# Patient Record
Sex: FEMALE | Race: WHITE | Employment: FULL TIME | URBAN - METROPOLITAN AREA
[De-identification: names, ages, dates, MRNs, and addresses within clinical notes are randomized per-mention and may not be internally consistent; named-entity substitution may affect disease eponyms.]

---
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
## 2019-02-12 ENCOUNTER — HOSPITAL ENCOUNTER (EMERGENCY)
Age: 19
Discharge: HOME | End: 2019-02-12
Payer: COMMERCIAL

## 2019-02-12 ENCOUNTER — APPOINTMENT (OUTPATIENT)
Dept: GENERAL RADIOLOGY | Age: 19
End: 2019-02-12
Payer: COMMERCIAL

## 2019-02-12 VITALS
DIASTOLIC BLOOD PRESSURE: 72 MMHG | TEMPERATURE: 99 F | OXYGEN SATURATION: 95 % | WEIGHT: 160 LBS | HEART RATE: 72 BPM | RESPIRATION RATE: 18 BRPM | HEIGHT: 65 IN | SYSTOLIC BLOOD PRESSURE: 117 MMHG | BODY MASS INDEX: 26.66 KG/M2

## 2019-02-12 DIAGNOSIS — S93.402A SPRAIN OF LEFT ANKLE, UNSPECIFIED LIGAMENT, INITIAL ENCOUNTER: Primary | ICD-10-CM

## 2019-02-12 PROCEDURE — 6370000000 HC RX 637 (ALT 250 FOR IP): Performed by: PHYSICIAN ASSISTANT

## 2019-02-12 PROCEDURE — 99283 EMERGENCY DEPT VISIT LOW MDM: CPT

## 2019-02-12 PROCEDURE — 73600 X-RAY EXAM OF ANKLE: CPT

## 2019-02-12 RX ORDER — IBUPROFEN 800 MG/1
800 TABLET ORAL ONCE
Status: COMPLETED | OUTPATIENT
Start: 2019-02-12 | End: 2019-02-12

## 2019-02-12 RX ORDER — IBUPROFEN 800 MG/1
800 TABLET ORAL EVERY 6 HOURS PRN
Qty: 30 TABLET | Refills: 0 | Status: SHIPPED | OUTPATIENT
Start: 2019-02-12 | End: 2019-08-26

## 2019-02-12 RX ADMIN — IBUPROFEN 800 MG: 800 TABLET, FILM COATED ORAL at 01:55

## 2019-02-12 ASSESSMENT — PAIN DESCRIPTION - LOCATION: LOCATION: ANKLE

## 2019-02-12 ASSESSMENT — PAIN SCALES - GENERAL
PAINLEVEL_OUTOF10: 8
PAINLEVEL_OUTOF10: 8

## 2019-02-12 ASSESSMENT — PAIN DESCRIPTION - PAIN TYPE: TYPE: ACUTE PAIN

## 2019-03-04 ENCOUNTER — APPOINTMENT (OUTPATIENT)
Dept: GENERAL RADIOLOGY | Age: 19
End: 2019-03-04
Payer: COMMERCIAL

## 2019-03-04 ENCOUNTER — HOSPITAL ENCOUNTER (EMERGENCY)
Age: 19
Discharge: HOME | End: 2019-03-04
Payer: COMMERCIAL

## 2019-03-04 VITALS
DIASTOLIC BLOOD PRESSURE: 86 MMHG | WEIGHT: 160 LBS | OXYGEN SATURATION: 98 % | HEIGHT: 65 IN | RESPIRATION RATE: 15 BRPM | BODY MASS INDEX: 26.66 KG/M2 | TEMPERATURE: 98.8 F | SYSTOLIC BLOOD PRESSURE: 124 MMHG | HEART RATE: 78 BPM

## 2019-03-04 DIAGNOSIS — M25.571 ACUTE RIGHT ANKLE PAIN: ICD-10-CM

## 2019-03-04 DIAGNOSIS — S80.01XA CONTUSION OF RIGHT KNEE, INITIAL ENCOUNTER: Primary | ICD-10-CM

## 2019-03-04 PROCEDURE — 73610 X-RAY EXAM OF ANKLE: CPT

## 2019-03-04 PROCEDURE — 99283 EMERGENCY DEPT VISIT LOW MDM: CPT

## 2019-03-04 PROCEDURE — 73560 X-RAY EXAM OF KNEE 1 OR 2: CPT

## 2019-03-04 RX ORDER — NAPROXEN 500 MG/1
500 TABLET ORAL 2 TIMES DAILY PRN
Qty: 20 TABLET | Refills: 0 | Status: SHIPPED | OUTPATIENT
Start: 2019-03-04 | End: 2019-08-26

## 2019-03-04 ASSESSMENT — PAIN SCALES - GENERAL: PAINLEVEL_OUTOF10: 7

## 2019-08-26 ENCOUNTER — APPOINTMENT (OUTPATIENT)
Dept: GENERAL RADIOLOGY | Age: 19
End: 2019-08-26
Payer: COMMERCIAL

## 2019-08-26 ENCOUNTER — HOSPITAL ENCOUNTER (EMERGENCY)
Age: 19
Discharge: HOME | End: 2019-08-26
Payer: COMMERCIAL

## 2019-08-26 VITALS
SYSTOLIC BLOOD PRESSURE: 112 MMHG | HEIGHT: 65 IN | BODY MASS INDEX: 27.32 KG/M2 | HEART RATE: 68 BPM | WEIGHT: 164 LBS | TEMPERATURE: 98.3 F | DIASTOLIC BLOOD PRESSURE: 51 MMHG | OXYGEN SATURATION: 100 % | RESPIRATION RATE: 14 BRPM

## 2019-08-26 DIAGNOSIS — S09.92XA NOSE INJURY, INITIAL ENCOUNTER: Primary | ICD-10-CM

## 2019-08-26 PROCEDURE — 99283 EMERGENCY DEPT VISIT LOW MDM: CPT

## 2019-08-26 PROCEDURE — 6370000000 HC RX 637 (ALT 250 FOR IP): Performed by: PHYSICIAN ASSISTANT

## 2019-08-26 PROCEDURE — 70160 X-RAY EXAM OF NASAL BONES: CPT

## 2019-08-26 RX ORDER — IBUPROFEN 600 MG/1
600 TABLET ORAL EVERY 6 HOURS PRN
Qty: 20 TABLET | Refills: 0 | Status: SHIPPED | OUTPATIENT
Start: 2019-08-26 | End: 2020-02-13

## 2019-08-26 RX ORDER — IBUPROFEN 600 MG/1
600 TABLET ORAL ONCE
Status: COMPLETED | OUTPATIENT
Start: 2019-08-26 | End: 2019-08-26

## 2019-08-26 RX ADMIN — IBUPROFEN 600 MG: 600 TABLET ORAL at 18:23

## 2019-08-26 ASSESSMENT — PAIN DESCRIPTION - LOCATION: LOCATION: FACE

## 2019-08-26 ASSESSMENT — PAIN DESCRIPTION - PAIN TYPE: TYPE: ACUTE PAIN

## 2019-08-26 ASSESSMENT — PAIN SCALES - GENERAL
PAINLEVEL_OUTOF10: 8
PAINLEVEL_OUTOF10: 8

## 2019-08-26 ASSESSMENT — PAIN DESCRIPTION - DESCRIPTORS: DESCRIPTORS: ACHING;PRESSURE

## 2019-08-26 NOTE — ED PROVIDER NOTES
days 30 tablet 0    pantoprazole (PROTONIX) 40 MG tablet Take 1 tablet by mouth every morning (before breakfast) 12 tablet 0    White Petrolatum-Mineral Oil (ARTIFICIAL TEARS) 83-15 % Place into the right eye every evening 1 Tube 2    buPROPion (WELLBUTRIN SR) 150 MG extended release tablet Take 150 mg by mouth daily      acetaminophen-codeine (TYLENOL/CODEINE #3) 300-30 MG per tablet Take 1 tablet by mouth every 6 hours as needed for Pain 6 tablet 0    Dexmethylphenidate HCl ER (FOCALIN XR) 30 MG CP24 Take 30 mg by mouth 2 times daily       sertraline (ZOLOFT) 100 MG tablet Take 150 mg by mouth daily       hydrOXYzine (VISTARIL) 25 MG capsule Take 25 mg by mouth 3 times daily as needed for Itching         ALLERGIES    No Known Allergies    SOCIAL & FAMILY HISTORY    Social History     Socioeconomic History    Marital status: Single     Spouse name: None    Number of children: None    Years of education: None    Highest education level: None   Occupational History    None   Social Needs    Financial resource strain: None    Food insecurity:     Worry: None     Inability: None    Transportation needs:     Medical: None     Non-medical: None   Tobacco Use    Smoking status: Current Some Day Smoker     Packs/day: 1.50    Smokeless tobacco: Never Used   Substance and Sexual Activity    Alcohol use: No    Drug use: No    Sexual activity: Never   Lifestyle    Physical activity:     Days per week: None     Minutes per session: None    Stress: None   Relationships    Social connections:     Talks on phone: None     Gets together: None     Attends Denominational service: None     Active member of club or organization: None     Attends meetings of clubs or organizations: None     Relationship status: None    Intimate partner violence:     Fear of current or ex partner: None     Emotionally abused: None     Physically abused: None     Forced sexual activity: None   Other Topics Concern    None   Social

## 2019-11-04 ENCOUNTER — HOSPITAL ENCOUNTER (OUTPATIENT)
Dept: GENERAL RADIOLOGY | Age: 19
Discharge: HOME | End: 2019-11-04
Payer: COMMERCIAL

## 2019-11-04 ENCOUNTER — HOSPITAL ENCOUNTER (OUTPATIENT)
Age: 19
Discharge: HOME | End: 2019-11-04
Payer: COMMERCIAL

## 2019-11-04 DIAGNOSIS — S93.401A SPRAIN OF RIGHT ANKLE, UNSPECIFIED LIGAMENT, INITIAL ENCOUNTER: ICD-10-CM

## 2019-11-04 PROCEDURE — 73610 X-RAY EXAM OF ANKLE: CPT

## 2020-02-13 ENCOUNTER — HOSPITAL ENCOUNTER (EMERGENCY)
Age: 20
Discharge: HOME | End: 2020-02-13
Payer: COMMERCIAL

## 2020-02-13 ENCOUNTER — APPOINTMENT (OUTPATIENT)
Dept: GENERAL RADIOLOGY | Age: 20
End: 2020-02-13
Payer: COMMERCIAL

## 2020-02-13 VITALS
WEIGHT: 164.02 LBS | TEMPERATURE: 98.1 F | RESPIRATION RATE: 18 BRPM | HEART RATE: 95 BPM | SYSTOLIC BLOOD PRESSURE: 132 MMHG | OXYGEN SATURATION: 98 % | BODY MASS INDEX: 27.33 KG/M2 | HEIGHT: 65 IN | DIASTOLIC BLOOD PRESSURE: 63 MMHG

## 2020-02-13 LAB
RAPID INFLUENZA  B AGN: NEGATIVE
RAPID INFLUENZA A AGN: NEGATIVE

## 2020-02-13 PROCEDURE — 99283 EMERGENCY DEPT VISIT LOW MDM: CPT

## 2020-02-13 PROCEDURE — 6360000002 HC RX W HCPCS: Performed by: PHYSICIAN ASSISTANT

## 2020-02-13 PROCEDURE — 87804 INFLUENZA ASSAY W/OPTIC: CPT

## 2020-02-13 PROCEDURE — 71046 X-RAY EXAM CHEST 2 VIEWS: CPT

## 2020-02-13 PROCEDURE — 96372 THER/PROPH/DIAG INJ SC/IM: CPT

## 2020-02-13 PROCEDURE — 6370000000 HC RX 637 (ALT 250 FOR IP): Performed by: PHYSICIAN ASSISTANT

## 2020-02-13 RX ORDER — GUAIFENESIN/DEXTROMETHORPHAN 100-10MG/5
5 SYRUP ORAL 4 TIMES DAILY PRN
Qty: 120 ML | Refills: 0 | Status: SHIPPED | OUTPATIENT
Start: 2020-02-13 | End: 2020-02-23

## 2020-02-13 RX ORDER — GUAIFENESIN/DEXTROMETHORPHAN 100-10MG/5
5 SYRUP ORAL ONCE
Status: COMPLETED | OUTPATIENT
Start: 2020-02-13 | End: 2020-02-13

## 2020-02-13 RX ORDER — ACETAMINOPHEN 500 MG
500 TABLET ORAL 4 TIMES DAILY PRN
Qty: 120 TABLET | Refills: 0 | Status: SHIPPED | OUTPATIENT
Start: 2020-02-13 | End: 2021-10-04

## 2020-02-13 RX ORDER — IBUPROFEN 600 MG/1
600 TABLET ORAL 4 TIMES DAILY PRN
Qty: 120 TABLET | Refills: 0 | Status: SHIPPED | OUTPATIENT
Start: 2020-02-13 | End: 2020-09-04 | Stop reason: ALTCHOICE

## 2020-02-13 RX ORDER — KETOROLAC TROMETHAMINE 30 MG/ML
30 INJECTION, SOLUTION INTRAMUSCULAR; INTRAVENOUS ONCE
Status: COMPLETED | OUTPATIENT
Start: 2020-02-13 | End: 2020-02-13

## 2020-02-13 RX ADMIN — GUAIFENESIN AND DEXTROMETHORPHAN 5 ML: 100; 10 SYRUP ORAL at 15:34

## 2020-02-13 RX ADMIN — KETOROLAC TROMETHAMINE 30 MG: 30 INJECTION, SOLUTION INTRAMUSCULAR; INTRAVENOUS at 15:34

## 2020-02-13 ASSESSMENT — PAIN SCALES - GENERAL
PAINLEVEL_OUTOF10: 8
PAINLEVEL_OUTOF10: 8

## 2020-02-13 NOTE — ED PROVIDER NOTES
[facility]      PCP: Ananda Sterling MD    [address] [facility]    Chief Complaint   Patient presents with    Pharyngitis     started yesterday     Fever     x2 days    Generalized Body Aches     started yesterday    Cough     x2 days         This patient was not evaluated by the attending physician. I have independently evaluated this patient . HPI    Denver Contreras is a 22 y.o. female who presents with a sore throat and cough since the onset 2 days ago. The duration has been constant since the onset. The pain worsens with swallowing. The patient has associated fevers, highest 1 was 103. She has been taking Tylenol for the fevers. Admits to 2 sick contacts that were positive for the flu. She also has general body aches, chest pain and shortness of breath. Denies history of asthma. No nausea, vomiting, diarrhea, ear pain, neck stiffness, or neurological complaints. REVIEW OF SYSTEMS    Constitutional: + fever, chills  HEENT:  See above  Cardiovascular:  _+ pleuritic pain, no palpitations. Respiratory:  + dry cough, shortness of breath   GI:  Denies abdominal pain, vomiting, or diarrhea   Neurologic:  Denies headahce confusion, light headedness, dizziness, or syncope   Skin:  No rash    All other review of systems are negative  See HPI and nursing notes for additional information       PAST MEDICAL AND SURGICAL HISTORY    Past Medical History:   Diagnosis Date    ADHD (attention deficit hyperactivity disorder)      History reviewed. No pertinent surgical history.     CURRENT MEDICATIONS    Current Outpatient Rx   Medication Sig Dispense Refill    guaiFENesin-dextromethorphan (ROBITUSSIN DM) 100-10 MG/5ML syrup Take 5 mLs by mouth 4 times daily as needed for Cough 120 mL 0    ibuprofen (ADVIL;MOTRIN) 600 MG tablet Take 1 tablet by mouth 4 times daily as needed for Pain 120 tablet 0    acetaminophen (TYLENOL) 500 MG tablet Take 1 tablet by mouth 4 times daily as needed for

## 2020-02-13 NOTE — ED NOTES
Discharge instructions reviewed with patient. Medications and follow up were discussed.  Patient denies further questions and verbalizes understanding     Chris, RN  02/13/20 2802

## 2020-09-04 ENCOUNTER — HOSPITAL ENCOUNTER (EMERGENCY)
Age: 20
Discharge: HOME | End: 2020-09-04
Payer: COMMERCIAL

## 2020-09-04 VITALS
RESPIRATION RATE: 16 BRPM | TEMPERATURE: 98.2 F | WEIGHT: 165 LBS | BODY MASS INDEX: 27.49 KG/M2 | OXYGEN SATURATION: 100 % | HEART RATE: 75 BPM | DIASTOLIC BLOOD PRESSURE: 82 MMHG | HEIGHT: 65 IN | SYSTOLIC BLOOD PRESSURE: 136 MMHG

## 2020-09-04 PROCEDURE — 99283 EMERGENCY DEPT VISIT LOW MDM: CPT

## 2020-09-04 PROCEDURE — 6370000000 HC RX 637 (ALT 250 FOR IP): Performed by: PHYSICIAN ASSISTANT

## 2020-09-04 RX ORDER — IBUPROFEN 600 MG/1
600 TABLET ORAL EVERY 6 HOURS PRN
Qty: 20 TABLET | Refills: 0 | Status: SHIPPED | OUTPATIENT
Start: 2020-09-04 | End: 2021-07-19

## 2020-09-04 RX ORDER — DIAPER,BRIEF,INFANT-TODD,DISP
EACH MISCELLANEOUS ONCE
Status: COMPLETED | OUTPATIENT
Start: 2020-09-04 | End: 2020-09-04

## 2020-09-04 RX ORDER — LIDOCAINE HYDROCHLORIDE 20 MG/ML
5 INJECTION, SOLUTION INFILTRATION; PERINEURAL ONCE
Status: DISCONTINUED | OUTPATIENT
Start: 2020-09-04 | End: 2020-09-04 | Stop reason: HOSPADM

## 2020-09-04 RX ORDER — CEPHALEXIN 500 MG/1
500 CAPSULE ORAL 2 TIMES DAILY
Qty: 14 CAPSULE | Refills: 0 | Status: SHIPPED | OUTPATIENT
Start: 2020-09-04 | End: 2020-09-11

## 2020-09-04 RX ADMIN — BACITRACIN ZINC 1 G: 500 OINTMENT TOPICAL at 19:54

## 2020-09-04 ASSESSMENT — PAIN SCALES - GENERAL: PAINLEVEL_OUTOF10: 8

## 2020-09-04 NOTE — LETTER
[facility]  [address] [facility] [address]  Phone: [phone]  Fax: [phone]               September 4, 2020    Patient: Jasiel Kahn   YOB: 2000   Date of Visit: 9/4/2020       To Whom It May Concern:    Symone Keyes was seen and treated in our emergency department on 9/4/2020. She may return to work on 9/5/2020.       Sincerely,       Lewis Romero RN         Signature:__________________________________

## 2020-09-04 NOTE — LETTER
[facility]  [address] [facility], [place] [address]  Phone: [phone]  Fax: [phone]             September 4, 2020    Patient: Sesar Dupree   YOB: 2000   Date of Visit: 9/4/2020       To Whom It May Concern:    Corinne Montane was seen and treated in our emergency department on 9/4/2020. She may return to work on 9/5/2020.       Sincerely,       Nurse        Signature:__________________________________

## 2020-09-05 NOTE — ED NOTES
Discharge instructions reviewed. All questions answered to pt satisfaction. Pt alert, oriented, and ambulatory upon discharge.      Kesha Hall RN  09/04/20 2004

## 2020-09-05 NOTE — ED PROVIDER NOTES
EMERGENCY DEPARTMENT ENCOUNTER        PCP: Baljinder Kamara MD    CHIEF COMPLAINT    Chief Complaint   Patient presents with    Foreign Body     right thumb; 2 days ago states that she got a splinter that she can't get out         This patient was not evaluated by the attending physician. I have independently evaluated this patient . HPI    Ulises Hinojosa is a 22 y.o. female who presents to the emergency department today concern for possible retained splinter in her right thumb. States it happened approximately 2 days ago. States that she has been digging at the thumb trying to remove the splinter without success. The thumb is now red, moderately painful. Patient is positive that a thorn is in her finger. Up to date Tetanus Vaccination Status    REVIEW OF SYSTEMS    General:   Denies symptoms preceding injury. Skin:. SEE HPI  Musculoskeletal:  No distal numbness, tingling. No obvious tendon or motor deficits. Denies any other musculoskeletal injuries or skin trauma. All other review of systems are negative  See HPI and nursing notes for additional information     PAST MEDICAL & SURGICAL HISTORY    Past Medical History:   Diagnosis Date    ADHD (attention deficit hyperactivity disorder)      History reviewed. No pertinent surgical history. CURRENT MEDICATIONS    Current Outpatient Rx   Medication Sig Dispense Refill    mupirocin (BACTROBAN) 2 % ointment Apply topically 2-3 times daily to affected area after soaking finger  .  1 Tube 0    cephALEXin (KEFLEX) 500 MG capsule Take 1 capsule by mouth 2 times daily for 7 days 14 capsule 0    ibuprofen (IBU) 600 MG tablet Take 1 tablet by mouth every 6 hours as needed for Pain 20 tablet 0    acetaminophen (TYLENOL) 500 MG tablet Take 1 tablet by mouth 4 times daily as needed for Pain 120 tablet 0    predniSONE (DELTASONE) 20 MG tablet Take 80mg (4 tablets) for 3 days  Then 60mg (3 tablets) for 3 days  Then 40mg (2 tablets for 3 days  Then 20mg (1 tablet) for 3 days 30 tablet 0    pantoprazole (PROTONIX) 40 MG tablet Take 1 tablet by mouth every morning (before breakfast) 12 tablet 0    White Petrolatum-Mineral Oil (ARTIFICIAL TEARS) 83-15 % Place into the right eye every evening 1 Tube 2    buPROPion (WELLBUTRIN SR) 150 MG extended release tablet Take 150 mg by mouth daily      acetaminophen-codeine (TYLENOL/CODEINE #3) 300-30 MG per tablet Take 1 tablet by mouth every 6 hours as needed for Pain 6 tablet 0    Dexmethylphenidate HCl ER (FOCALIN XR) 30 MG CP24 Take 30 mg by mouth 2 times daily       sertraline (ZOLOFT) 100 MG tablet Take 150 mg by mouth daily       hydrOXYzine (VISTARIL) 25 MG capsule Take 25 mg by mouth 3 times daily as needed for Itching         ALLERGIES    No Known Allergies    SOCIAL & FAMILY HISTORY    Social History     Socioeconomic History    Marital status: Single     Spouse name: None    Number of children: None    Years of education: None    Highest education level: None   Occupational History    None   Social Needs    Financial resource strain: None    Food insecurity     Worry: None     Inability: None    Transportation needs     Medical: None     Non-medical: None   Tobacco Use    Smoking status: Current Some Day Smoker     Packs/day: 0.50    Smokeless tobacco: Never Used   Substance and Sexual Activity    Alcohol use: No    Drug use: No    Sexual activity: None   Lifestyle    Physical activity     Days per week: None     Minutes per session: None    Stress: None   Relationships    Social connections     Talks on phone: None     Gets together: None     Attends Muslim service: None     Active member of club or organization: None     Attends meetings of clubs or organizations: None     Relationship status: None    Intimate partner violence     Fear of current or ex partner: None     Emotionally abused: None     Physically abused: None     Forced sexual activity: None   Other Topics Concern    None   Social History Narrative    None     History reviewed. No pertinent family history. PHYSICAL EXAM    VITAL SIGNS: /82   Pulse 75   Temp 98.2 °F (36.8 °C) (Oral)   Resp 16   Ht 5' 5\" (1.651 m)   Wt 165 lb (74.8 kg)   SpO2 100%   BMI 27.46 kg/m²   Constitutional:  Well developed, Appears comfortable  HEENT:  Normocephalic, Atraumatic. PERRL, EOMI. Ear canals, nasal passages, oropharynx clear of blood or clear fluid. Musculoskeletal:  No gross deformities. No motor deficits. Distal sensation and capillary refill intact. Vascular: Distal pulses and capillary refill intact. Integument:    Inspection there is a puncture wound into the right thumb, no obvious retained splinter. Is mildly red. Tender to touch. No obvious tendon involvement    Distal sensation, capillary refill intact. Distal joints with full range of motion    See below for further details. Neurologic:  Awake and alert, normal flow of speech. CN 2-12 intact. Psychiatric: Cooperative, pleasant affect      ________________________________________________________________________    Foreign Body Removal Procedure Note    Indication: Foreign body under the skin    Procedure: The area of the foreign body was prepped with betadine and draped in a sterile fashion. Local anesthesia over the foreign body site was obtained by infiltration using 1% Lidocaine without epinephrine. The foreign body was then unable to be removed, at this time unsure if there is a retained splinter, will advise warm water soaks. N.  After the procedure the area was dressed with bacitracin and a sterile dressing. The patient's tetanus status was up to date and did not require a booster dose. The patient tolerated the procedure well.     Complications: None      ________________________________________________________________________    ED COURSE & MEDICAL DECISION MAKING        I discussed possibility of infection, retained foreign body, tendon injury, nerve injury. Wound care instructions discussed with patient today. Wound check in 2-3 days. Unsure if there was even a retained splinter at this time. Patient was covered with oral and topical antibiotic, encouraged for warm water Epson salt soaks. Return to emergency Department precautions were discussed in detail with patient who understands and agrees. Vital signs and nursing notes reviewed during ED course. All pertinent Lab data and radiographic results reviewed with patient at bedside. The patient and/or the family were informed of the results of any tests/labs/imaging, the treatment plan, and time was allotted to answer questions. Clinical  IMPRESSION    1. Splinter in skin      Comment: Please note this report has been produced using speech recognition software and may contain errors related to that system including errors in grammar, punctuation, and spelling, as well as words and phrases that may be inappropriate. If there are any questions or concerns please feel free to contact the dictating provider for clarification.         Tj Bolton 411, PA  09/04/20 2729

## 2020-10-07 ENCOUNTER — APPOINTMENT (OUTPATIENT)
Dept: GENERAL RADIOLOGY | Age: 20
End: 2020-10-07
Payer: COMMERCIAL

## 2020-10-07 ENCOUNTER — HOSPITAL ENCOUNTER (EMERGENCY)
Age: 20
Discharge: HOME | End: 2020-10-07
Attending: EMERGENCY MEDICINE
Payer: COMMERCIAL

## 2020-10-07 VITALS
BODY MASS INDEX: 29.99 KG/M2 | SYSTOLIC BLOOD PRESSURE: 122 MMHG | HEIGHT: 65 IN | WEIGHT: 180 LBS | TEMPERATURE: 98.1 F | RESPIRATION RATE: 17 BRPM | OXYGEN SATURATION: 97 % | HEART RATE: 72 BPM | DIASTOLIC BLOOD PRESSURE: 67 MMHG

## 2020-10-07 PROCEDURE — 73630 X-RAY EXAM OF FOOT: CPT

## 2020-10-07 PROCEDURE — 94761 N-INVAS EAR/PLS OXIMETRY MLT: CPT

## 2020-10-07 PROCEDURE — 99283 EMERGENCY DEPT VISIT LOW MDM: CPT

## 2020-10-07 PROCEDURE — 6370000000 HC RX 637 (ALT 250 FOR IP): Performed by: EMERGENCY MEDICINE

## 2020-10-07 PROCEDURE — 73610 X-RAY EXAM OF ANKLE: CPT

## 2020-10-07 RX ORDER — ACETAMINOPHEN 500 MG
1000 TABLET ORAL ONCE
Status: COMPLETED | OUTPATIENT
Start: 2020-10-07 | End: 2020-10-07

## 2020-10-07 RX ORDER — NAPROXEN 250 MG/1
500 TABLET ORAL ONCE
Status: COMPLETED | OUTPATIENT
Start: 2020-10-07 | End: 2020-10-07

## 2020-10-07 RX ORDER — NAPROXEN 500 MG/1
500 TABLET ORAL 2 TIMES DAILY
Qty: 60 TABLET | Refills: 0 | OUTPATIENT
Start: 2020-10-07 | End: 2021-10-04

## 2020-10-07 RX ORDER — ACETAMINOPHEN 325 MG/1
650 TABLET ORAL EVERY 6 HOURS PRN
Qty: 120 TABLET | Refills: 3 | OUTPATIENT
Start: 2020-10-07 | End: 2021-10-04

## 2020-10-07 RX ADMIN — ACETAMINOPHEN 1000 MG: 500 TABLET ORAL at 14:41

## 2020-10-07 RX ADMIN — NAPROXEN 500 MG: 250 TABLET ORAL at 14:41

## 2020-10-07 ASSESSMENT — PAIN DESCRIPTION - PAIN TYPE: TYPE: ACUTE PAIN

## 2020-10-07 ASSESSMENT — PAIN DESCRIPTION - ORIENTATION: ORIENTATION: LEFT

## 2020-10-07 ASSESSMENT — PAIN SCALES - GENERAL
PAINLEVEL_OUTOF10: 5
PAINLEVEL_OUTOF10: 8

## 2020-10-07 ASSESSMENT — PAIN DESCRIPTION - LOCATION: LOCATION: ANKLE

## 2020-10-07 ASSESSMENT — ENCOUNTER SYMPTOMS
RESPIRATORY NEGATIVE: 1
ALLERGIC/IMMUNOLOGIC NEGATIVE: 1
EYES NEGATIVE: 1
GASTROINTESTINAL NEGATIVE: 1

## 2020-10-07 NOTE — ED NOTES
Pt presents to the ED today with c/o left ankle pain. Pt states that she was walking down the stairs and when reaching the bottom she slipped on the floor causing her to fall. Pt denies hitting head or loc. Only c/o left ankle pain. Alert and oriented at this time.       Mahsa Sanabria RN  10/07/20 5626

## 2020-10-07 NOTE — ED PROVIDER NOTES
[facility]      TRIAGE CHIEF COMPLAINT:   Ankle Injury (left)      [place]:  Tricia Rodriguez is a 22 y.o. female that presents with complaint of left foot, ankle pain. Patient states 30 minutes before arrival she tripped and fell on a bag injuring her left ankle. Has a history of ankle sprains. She heard a pop. Did not take any medicine for this. Came right away here. Denies any headache nausea vomiting chest pain shortness of breath pregnancy weakness numbness tingling no other questions or concerns no other injuries no blood thinners. No head neck or back pain. REVIEW OF SYSTEMS:  At least 10 systems reviewed and otherwise acutely negative except as in the [address] Review of Systems   Constitutional: Negative. HENT: Negative. Eyes: Negative. Respiratory: Negative. Cardiovascular: Negative. Gastrointestinal: Negative. Endocrine: Negative. Genitourinary: Negative. Musculoskeletal: Positive for arthralgias, joint swelling and myalgias. Skin: Negative. Allergic/Immunologic: Negative. Neurological: Negative. Hematological: Negative. Psychiatric/Behavioral: Negative. All other systems reviewed and are negative. Past Medical History:   Diagnosis Date    ADHD (attention deficit hyperactivity disorder)      History reviewed. No pertinent surgical history. History reviewed. No pertinent family history.   Social History     Socioeconomic History    Marital status: Single     Spouse name: Not on file    Number of children: Not on file    Years of education: Not on file    Highest education level: Not on file   Occupational History    Not on file   Social Needs    Financial resource strain: Not on file    Food insecurity     Worry: Not on file     Inability: Not on file    Transportation needs     Medical: Not on file     Non-medical: Not on file   Tobacco Use    Smoking status: Current Some Day Smoker     Packs/day: 0.50    Smokeless tobacco: Never Used   Substance and Sexual Activity    Alcohol use: No    Drug use: No    Sexual activity: Not on file   Lifestyle    Physical activity     Days per week: Not on file     Minutes per session: Not on file    Stress: Not on file   Relationships    Social connections     Talks on phone: Not on file     Gets together: Not on file     Attends Adventism service: Not on file     Active member of club or organization: Not on file     Attends meetings of clubs or organizations: Not on file     Relationship status: Not on file    Intimate partner violence     Fear of current or ex partner: Not on file     Emotionally abused: Not on file     Physically abused: Not on file     Forced sexual activity: Not on file   Other Topics Concern    Not on file   Social History Narrative    Not on file     No current facility-administered medications for this encounter.       Current Outpatient Medications   Medication Sig Dispense Refill    naproxen (NAPROSYN) 500 MG tablet Take 1 tablet by mouth 2 times daily 60 tablet 0    acetaminophen (TYLENOL) 325 MG tablet Take 2 tablets by mouth every 6 hours as needed for Pain 120 tablet 3    ibuprofen (IBU) 600 MG tablet Take 1 tablet by mouth every 6 hours as needed for Pain 20 tablet 0    acetaminophen (TYLENOL) 500 MG tablet Take 1 tablet by mouth 4 times daily as needed for Pain 120 tablet 0    predniSONE (DELTASONE) 20 MG tablet Take 80mg (4 tablets) for 3 days  Then 60mg (3 tablets) for 3 days  Then 40mg (2 tablets for 3 days  Then 20mg (1 tablet) for 3 days 30 tablet 0    pantoprazole (PROTONIX) 40 MG tablet Take 1 tablet by mouth every morning (before breakfast) 12 tablet 0    White Petrolatum-Mineral Oil (ARTIFICIAL TEARS) 83-15 % Place into the right eye every evening 1 Tube 2    buPROPion (WELLBUTRIN SR) 150 MG extended release tablet Take 150 mg by mouth daily      acetaminophen-codeine (TYLENOL/CODEINE #3) 300-30 MG per tablet Take 1 tablet by mouth every 6 hours as needed for Pain 6 tablet 0    Dexmethylphenidate HCl ER (FOCALIN XR) 30 MG CP24 Take 30 mg by mouth 2 times daily       sertraline (ZOLOFT) 100 MG tablet Take 150 mg by mouth daily       hydrOXYzine (VISTARIL) 25 MG capsule Take 25 mg by mouth 3 times daily as needed for Itching        No Known Allergies  No current facility-administered medications for this encounter.       Current Outpatient Medications   Medication Sig Dispense Refill    naproxen (NAPROSYN) 500 MG tablet Take 1 tablet by mouth 2 times daily 60 tablet 0    acetaminophen (TYLENOL) 325 MG tablet Take 2 tablets by mouth every 6 hours as needed for Pain 120 tablet 3    ibuprofen (IBU) 600 MG tablet Take 1 tablet by mouth every 6 hours as needed for Pain 20 tablet 0    acetaminophen (TYLENOL) 500 MG tablet Take 1 tablet by mouth 4 times daily as needed for Pain 120 tablet 0    predniSONE (DELTASONE) 20 MG tablet Take 80mg (4 tablets) for 3 days  Then 60mg (3 tablets) for 3 days  Then 40mg (2 tablets for 3 days  Then 20mg (1 tablet) for 3 days 30 tablet 0    pantoprazole (PROTONIX) 40 MG tablet Take 1 tablet by mouth every morning (before breakfast) 12 tablet 0    White Petrolatum-Mineral Oil (ARTIFICIAL TEARS) 83-15 % Place into the right eye every evening 1 Tube 2    buPROPion (WELLBUTRIN SR) 150 MG extended release tablet Take 150 mg by mouth daily      acetaminophen-codeine (TYLENOL/CODEINE #3) 300-30 MG per tablet Take 1 tablet by mouth every 6 hours as needed for Pain 6 tablet 0    Dexmethylphenidate HCl ER (FOCALIN XR) 30 MG CP24 Take 30 mg by mouth 2 times daily       sertraline (ZOLOFT) 100 MG tablet Take 150 mg by mouth daily       hydrOXYzine (VISTARIL) 25 MG capsule Take 25 mg by mouth 3 times daily as needed for Itching         Nursing Notes Reviewed    VITAL SIGNS:  ED Triage Vitals [10/07/20 1400]   Enc Vitals Group      /67      Pulse 72      Resp 17      Temp 98.1 °F (36.7 °C)      Temp Source Oral SpO2 97 %      Weight 180 lb (81.6 kg)      Height 5' 5\" (1.651 m)      Head Circumference       Peak Flow       Pain Score       Pain Loc       Pain Edu? Excl. in [address]? PHYSICAL EXAM:  Physical Exam  Vitals signs and nursing note reviewed. Constitutional:       General: She is not in acute distress. Appearance: Normal appearance. She is well-developed and well-groomed. She is not ill-appearing, toxic-appearing or diaphoretic. HENT:      Head: Normocephalic and atraumatic. Right Ear: External ear normal.      Left Ear: External ear normal.      Nose: No congestion or rhinorrhea. Eyes:      General: No scleral icterus. Right eye: No discharge. Left eye: No discharge. Extraocular Movements: Extraocular movements intact. Conjunctiva/sclera: Conjunctivae normal.      Pupils: Pupils are equal, round, and reactive to light. Neck:      Musculoskeletal: Full passive range of motion without pain and normal range of motion. Normal range of motion. No edema, erythema, neck rigidity, crepitus, injury or pain with movement. Vascular: No JVD. Trachea: Phonation normal.   Cardiovascular:      Rate and Rhythm: Normal rate and regular rhythm. Pulses: Normal pulses. Heart sounds: Normal heart sounds. No murmur. No friction rub. No gallop. Pulmonary:      Effort: Pulmonary effort is normal. No respiratory distress. Breath sounds: Normal breath sounds. No stridor. No wheezing, rhonchi or rales. Abdominal:      General: Bowel sounds are normal. There is no distension. Palpations: Abdomen is soft. There is no mass. Tenderness: There is no abdominal tenderness. There is no guarding or rebound. Negative signs include Mcghee's sign, Rovsing's sign and McBurney's sign. Hernia: No hernia is present. Musculoskeletal:         General: Swelling and tenderness present. No deformity or signs of injury.       Right ankle: Normal.      Left ankle: She exhibits decreased range of motion and swelling. She exhibits no ecchymosis, no deformity, no laceration and normal pulse. Tenderness. Lateral malleolus and medial malleolus tenderness found. Achilles tendon normal. Achilles tendon exhibits normal Page's test results. Right lower leg: No edema. Left lower leg: No edema. Left foot: Normal.   Skin:     General: Skin is warm. Coloration: Skin is not jaundiced or pale. Findings: No bruising, erythema, lesion or rash. Neurological:      General: No focal deficit present. Mental Status: She is alert and oriented to person, place, and time. GCS: GCS eye subscore is 4. GCS verbal subscore is 5. GCS motor subscore is 6. Cranial Nerves: Cranial nerves are intact. No cranial nerve deficit, dysarthria or facial asymmetry. Sensory: Sensation is intact. No sensory deficit. Motor: Motor function is intact. No weakness, tremor, atrophy, abnormal muscle tone or seizure activity. Coordination: Coordination is intact. Coordination normal.   Psychiatric:         Mood and Affect: Mood normal.         Behavior: Behavior normal. Behavior is cooperative. Thought Content: Thought content normal.         Judgment: Judgment normal.           I have reviewed andinterpreted all of the currently available lab results from this visit (if applicable):    No results found for this visit on 10/07/20.      Radiographs (if obtained):  [] The following radiograph was interpreted by myself in the absence of a radiologist:  [x] Radiologist's Report Reviewed:    Xr Ankle Left (min 3 Views)    Result Date: 10/7/2020  EXAMINATION: THREE XRAY VIEWS OF THE LEFT FOOT; THREE XRAY VIEWS OF THE LEFT ANKLE 10/7/2020 2:07 pm COMPARISON: 02/12/2019 HISTORY: ORDERING SYSTEM PROVIDED HISTORY: pain TECHNOLOGIST PROVIDED HISTORY: Reason for exam:->pain Reason for Exam: left foot pain Acuity: Acute Type of Exam: Initial Mechanism of Injury: fall Relevant Medical/Surgical History: na; ORDERING SYSTEM PROVIDED HISTORY: trauma TECHNOLOGIST PROVIDED HISTORY: Reason for exam:->trauma Reason for Exam: left ankle pain Acuity: Acute Type of Exam: Initial Mechanism of Injury: fall Relevant Medical/Surgical History: na FINDINGS: Foot: No calcaneal spurring is identified. The Lisfranc joint appears intact. No fracture is identified. Phalanges and metatarsals appear intact as well. No osseous abnormality is identified. No radiopaque foreign body. No erosive changes are seen. No significant joint space narrowing is identified. Ankle: The ankle mortise appears intact. No fracture or dislocation is identified. The distal tibia and fibula appear intact. Unremarkable ankle in foot without acute abnormality. Xr Foot Left (min 3 Views)    Result Date: 10/7/2020  EXAMINATION: THREE XRAY VIEWS OF THE LEFT FOOT; THREE XRAY VIEWS OF THE LEFT ANKLE 10/7/2020 2:07 pm COMPARISON: 02/12/2019 HISTORY: ORDERING SYSTEM PROVIDED HISTORY: pain TECHNOLOGIST PROVIDED HISTORY: Reason for exam:->pain Reason for Exam: left foot pain Acuity: Acute Type of Exam: Initial Mechanism of Injury: fall Relevant Medical/Surgical History: na; ORDERING SYSTEM PROVIDED HISTORY: trauma TECHNOLOGIST PROVIDED HISTORY: Reason for exam:->trauma Reason for Exam: left ankle pain Acuity: Acute Type of Exam: Initial Mechanism of Injury: fall Relevant Medical/Surgical History: na FINDINGS: Foot: No calcaneal spurring is identified. The Lisfranc joint appears intact. No fracture is identified. Phalanges and metatarsals appear intact as well. No osseous abnormality is identified. No radiopaque foreign body. No erosive changes are seen. No significant joint space narrowing is identified. Ankle: The ankle mortise appears intact. No fracture or dislocation is identified. The distal tibia and fibula appear intact. Unremarkable ankle in foot without acute abnormality.        EKG (if obtained): (All EKG's are interpreted by myself in the absence of a cardiologist)    MDM:    Patient here with left foot, ankle pain after she tripped and fell on a bag 30 minutes before arrival.  She has a history of ankle sprain she had a pop and came straight here. Not take any medicine. Denies any other questions or concerns no fevers nausea vomiting chest pain shortness of breath neck or back pain no blood thinners denies being pregnant. She appears very well vital signs are stable. Imaging shows no acute fracture dislocation of her left foot or left ankle. She has good pulses Page test is negative no signs of infection or compartment syndrome or ischemia or infection. Patient given anti-inflammatories crutches Ace wrap work note advised rice given return precautions and follow-up information stable. Discharge.     CLINICAL IMPRESSION:  Final diagnoses:   Sprain of left ankle, unspecified ligament, initial encounter       (Please note that portions of this note may have been completed with a voice recognition program. Efforts were made to edit the dictations but occasionally words aremis-transcribed.)    DISPOSITION REFERRAL (if applicable):  Osvaldo Hampton MD  [address]  [phone]    In 1 day      [facility] Emergency Department  [facility] [id number] [phone]    If symptoms worsen    Ameya Glynn MD  [facility] [address]  [phone]    Schedule an appointment as soon as possible for a visit in 1 day  Orthopedic Surgery      DISPOSITION MEDICATIONS (if applicable):  New Prescriptions    ACETAMINOPHEN (TYLENOL) 325 MG TABLET    Take 2 tablets by mouth every 6 hours as needed for Pain    NAPROXEN (NAPROSYN) 500 MG TABLET    Take 1 tablet by mouth 2 times daily          DO Mendoza Kaur DO  10/07/20 7209

## 2020-10-16 ENCOUNTER — APPOINTMENT (OUTPATIENT)
Dept: GENERAL RADIOLOGY | Age: 20
End: 2020-10-16
Payer: COMMERCIAL

## 2020-10-16 ENCOUNTER — HOSPITAL ENCOUNTER (EMERGENCY)
Age: 20
Discharge: HOME | End: 2020-10-16
Payer: COMMERCIAL

## 2020-10-16 VITALS
TEMPERATURE: 98.2 F | WEIGHT: 180 LBS | OXYGEN SATURATION: 98 % | HEART RATE: 74 BPM | DIASTOLIC BLOOD PRESSURE: 71 MMHG | RESPIRATION RATE: 17 BRPM | BODY MASS INDEX: 29.99 KG/M2 | HEIGHT: 65 IN | SYSTOLIC BLOOD PRESSURE: 137 MMHG

## 2020-10-16 PROCEDURE — 6370000000 HC RX 637 (ALT 250 FOR IP): Performed by: PHYSICIAN ASSISTANT

## 2020-10-16 PROCEDURE — 99283 EMERGENCY DEPT VISIT LOW MDM: CPT

## 2020-10-16 PROCEDURE — 73610 X-RAY EXAM OF ANKLE: CPT

## 2020-10-16 RX ORDER — IBUPROFEN 600 MG/1
600 TABLET ORAL ONCE
Status: COMPLETED | OUTPATIENT
Start: 2020-10-16 | End: 2020-10-16

## 2020-10-16 RX ADMIN — IBUPROFEN 600 MG: 600 TABLET, FILM COATED ORAL at 03:19

## 2020-10-16 ASSESSMENT — PAIN DESCRIPTION - ORIENTATION: ORIENTATION: LEFT

## 2020-10-16 ASSESSMENT — PAIN DESCRIPTION - DESCRIPTORS: DESCRIPTORS: STABBING

## 2020-10-16 ASSESSMENT — PAIN SCALES - GENERAL: PAINLEVEL_OUTOF10: 8

## 2020-10-16 ASSESSMENT — PAIN DESCRIPTION - ONSET: ONSET: ON-GOING

## 2020-10-16 ASSESSMENT — PAIN DESCRIPTION - LOCATION: LOCATION: ANKLE

## 2020-10-16 ASSESSMENT — PAIN DESCRIPTION - FREQUENCY: FREQUENCY: CONTINUOUS

## 2020-10-16 NOTE — ED PROVIDER NOTES
Triage Chief Complaint:   Ankle Injury (left ankle)    [place]:  Jose Manuel Cormier is a 22 y.o. female that presents today complaining of unilateral foot/ankle pain. Context is, patient was at work. When she was walking twisted her ankle and felt a pop in her Achilles. Since then has had pain and difficulty walking so came here for evaluation    No paresthesias. Pain is ranked 08/10. Patient admits to no radiation. Pain is made worse with movement and palpation. Pain relieved some with rest.     ROS:  At least 06 systems reviewed and otherwise negative except as in the [address] Past Medical History:   Diagnosis Date    ADHD (attention deficit hyperactivity disorder)      History reviewed. No pertinent surgical history. History reviewed. No pertinent family history.   Social History     Socioeconomic History    Marital status: Single     Spouse name: Not on file    Number of children: Not on file    Years of education: Not on file    Highest education level: Not on file   Occupational History    Not on file   Social Needs    Financial resource strain: Not on file    Food insecurity     Worry: Not on file     Inability: Not on file    Transportation needs     Medical: Not on file     Non-medical: Not on file   Tobacco Use    Smoking status: Current Some Day Smoker     Packs/day: 0.50    Smokeless tobacco: Never Used   Substance and Sexual Activity    Alcohol use: No    Drug use: No    Sexual activity: Not on file   Lifestyle    Physical activity     Days per week: Not on file     Minutes per session: Not on file    Stress: Not on file   Relationships    Social connections     Talks on phone: Not on file     Gets together: Not on file     Attends Bahai service: Not on file     Active member of club or organization: Not on file     Attends meetings of clubs or organizations: Not on file     Relationship status: Not on file    Intimate partner violence     Fear of current or ex partner: Not on file 180 lb (81.6 kg)      Height 10/16/20 0253 5' 5\" (1.651 m)      Head Circumference --       Peak Flow --       Pain Score --       Pain Loc --       Pain Edu? --       Excl. in [address]? --      GENERAL APPEARANCE: Awake and alert. Cooperative. No acute distress. HEAD: Normocephalic. Atraumatic. NECK: Full ROM  LUNGS: Respirations unlabored. ABDOMEN: Soft. Non-tender. No guarding or rebound. No organomegaly. No palpable masses  MUSCULOSKELETAL: No acute deformities. KNEE/ANKLE/FOOT: left Medial malleolus is not tender to palpation. Lateral malleolus is not tender to palpation. Navicular is not tender to palpation. 5th metatarsal head is not tender to palpation. Proximal fibula is not tender to palpation. Patella is not tender to palpation. The calves are not tender to palpation. Active range of motion of the joints is  present without ligamentous laxity. There is tenderness palpation over the Achilles. There is no step-off. Page test shows intact Achilles. There is no obvious deformity. negative joint effusions. SKIN: Warm and dry. No rash, No erythema, No edema. No ecchymoses. NEUROLOGICAL: No gross facial drooping. Moves all 4 extremities spontaneously. PSYCHIATRIC: Normal mood. I have reviewed and interpreted all of the currently available lab results from this visit (if applicable):  No results found for this visit on 10/16/20. Radiographs (if obtained):  [] The following radiograph was interpreted by myself in the absence of a radiologist:   [] Radiologist's Report Reviewed:  XR ANKLE LEFT (MIN 3 VIEWS)   Final Result   No acute abnormality of the ankle. EKG (if obtained):   Please See Note of attending physician for EKG interpretation.      Chart review shows recent radiograph(s):  Xr Ankle Left (min 3 Views)    Result Date: 10/16/2020  EXAMINATION: THREE XRAY VIEWS OF THE LEFT ANKLE 10/16/2020 3:09 am COMPARISON: 10/07/2020 HISTORY: ORDERING SYSTEM PROVIDED HISTORY: trauma TECHNOLOGIST PROVIDED HISTORY: Reason for exam:->trauma Reason for Exam: trauma Acuity: Acute Type of Exam: Initial Mechanism of Injury: trauma Relevant Medical/Surgical History: trauma FINDINGS: No evidence of acute fracture or dislocation. Normal alignment of the ankle mortise. No focal osseous lesion. No evidence of joint effusion. No focal soft tissue abnormality. No acute abnormality of the ankle. Xr Ankle Left (min 3 Views)    Result Date: 10/7/2020  EXAMINATION: THREE XRAY VIEWS OF THE LEFT FOOT; THREE XRAY VIEWS OF THE LEFT ANKLE 10/7/2020 2:07 pm COMPARISON: 02/12/2019 HISTORY: ORDERING SYSTEM PROVIDED HISTORY: pain TECHNOLOGIST PROVIDED HISTORY: Reason for exam:->pain Reason for Exam: left foot pain Acuity: Acute Type of Exam: Initial Mechanism of Injury: fall Relevant Medical/Surgical History: na; ORDERING SYSTEM PROVIDED HISTORY: trauma TECHNOLOGIST PROVIDED HISTORY: Reason for exam:->trauma Reason for Exam: left ankle pain Acuity: Acute Type of Exam: Initial Mechanism of Injury: fall Relevant Medical/Surgical History: na FINDINGS: Foot: No calcaneal spurring is identified. The Lisfranc joint appears intact. No fracture is identified. Phalanges and metatarsals appear intact as well. No osseous abnormality is identified. No radiopaque foreign body. No erosive changes are seen. No significant joint space narrowing is identified. Ankle: The ankle mortise appears intact. No fracture or dislocation is identified. The distal tibia and fibula appear intact. Unremarkable ankle in foot without acute abnormality.      Xr Foot Left (min 3 Views)    Result Date: 10/7/2020  EXAMINATION: THREE XRAY VIEWS OF THE LEFT FOOT; THREE XRAY VIEWS OF THE LEFT ANKLE 10/7/2020 2:07 pm COMPARISON: 02/12/2019 HISTORY: ORDERING SYSTEM PROVIDED HISTORY: pain TECHNOLOGIST PROVIDED HISTORY: Reason for exam:->pain Reason for Exam: left foot pain Acuity: Acute Type of Exam: Initial Mechanism of Injury: fall Relevant Medical/Surgical History: na; ORDERING SYSTEM PROVIDED HISTORY: trauma TECHNOLOGIST PROVIDED HISTORY: Reason for exam:->trauma Reason for Exam: left ankle pain Acuity: Acute Type of Exam: Initial Mechanism of Injury: fall Relevant Medical/Surgical History: na FINDINGS: Foot: No calcaneal spurring is identified. The Lisfranc joint appears intact. No fracture is identified. Phalanges and metatarsals appear intact as well. No osseous abnormality is identified. No radiopaque foreign body. No erosive changes are seen. No significant joint space narrowing is identified. Ankle: The ankle mortise appears intact. No fracture or dislocation is identified. The distal tibia and fibula appear intact. Unremarkable ankle in foot without acute abnormality. MDM:   Neurovascularly intact. Patient presents today with signs and symptoms that are congruent with musculoskeletal strain/sprain of the achilles   Xray negative for any acute osseous abnormality     I have very low clinical suspicion of any fracture. However patient is educated that should symptoms persist and did not improve over the next 4-5 days patient should have orthopedic follow up as referred for x-rays to rule out fracture. I estimate there is LOW risk for Fracture, COMPARTMENT SYNDROME, DEEP VENOUS THROMBOSIS, COMPLETE TENDON RUPTURE, OR NEUROVASCULAR INJURY, thus I consider the discharge disposition reasonable. Pt is to be discharged home. Pt is  to return immediately to the emergency department if he has any new, worrisome or worsening symptoms. Pt is to follow up with PCP within 2 days. Patient/Surrogate vocalizes agreement and understanding with this plan and he has no questions upon disposition. Pt is comfortable upon disposition home. Patient is stable, Patients vital signs are stable. Vital signs and nursing notes reviewed during ED course. I independently managed patient today in the ED.         All pertinent Lab data and radiographic results reviewed with patient at bedside. The patient and/or the family were informed of the results of any tests/labs/imaging, the treatment plan, and time was allotted to answer questions. See chart for details of medications given during the ED stay. /71   Pulse 74   Temp 98.2 °F (36.8 °C) (Oral)   Resp 17   Ht 5' 5\" (1.651 m)   Wt 180 lb (81.6 kg)   SpO2 98%   BMI 29.95 kg/m²       Clinical Impression:  1. Injury of Achilles tendon, unspecified laterality, initial encounter        Disposition referral (if applicable):  Dania Gifford DO  [address]  [phone]    In 2 days      Disposition medications (if applicable):  New Prescriptions    No medications on file       Comment: Please note this report has been produced using speech recognition software and may contain errors related to that system including errors in grammar, punctuation, and spelling, as well as words and phrases that may be inappropriate. If there are any questions or concerns please feel free to contact the dictating provider for clarification.     Cristian Regan, [address] [place], [place]  10/16/20 4614

## 2020-10-16 NOTE — LETTER
[facility] Emergency Department  [address] [facility] [address]  Phone: [phone]  Fax: [phone]             October 16, 2020    Patient: Fazal German   YOB: 2000   Date of Visit: 10/16/2020       To Whom It May Concern:    Atiya Orozco was seen and treated in our emergency department on 10/16/2020. She may return to work on 10/17/20. Zoey Marshall is to rest foot X24hrs. .      Sincerely,             Signature:__________________________________

## 2020-10-16 NOTE — ED TRIAGE NOTES
Arrives from work for a re injured left ankle. She is to be using crutches however was unable to do so for work. She describes a sensation in her ankle of a rubber band snapping and is unable to bear weight.

## 2020-12-14 ENCOUNTER — HOSPITAL ENCOUNTER (EMERGENCY)
Age: 20
Discharge: HOME | End: 2020-12-14
Payer: COMMERCIAL

## 2020-12-14 VITALS — HEIGHT: 65 IN | BODY MASS INDEX: 27.49 KG/M2 | WEIGHT: 165 LBS

## 2020-12-14 PROCEDURE — 6370000000 HC RX 637 (ALT 250 FOR IP): Performed by: PHYSICIAN ASSISTANT

## 2020-12-14 PROCEDURE — 99283 EMERGENCY DEPT VISIT LOW MDM: CPT

## 2020-12-14 RX ORDER — ERYTHROMYCIN 5 MG/G
OINTMENT OPHTHALMIC ONCE
Status: COMPLETED | OUTPATIENT
Start: 2020-12-14 | End: 2020-12-14

## 2020-12-14 RX ORDER — ERYTHROMYCIN 5 MG/G
OINTMENT OPHTHALMIC
Qty: 1 TUBE | Refills: 0 | Status: SHIPPED | OUTPATIENT
Start: 2020-12-14 | End: 2020-12-24

## 2020-12-14 RX ORDER — TETRACAINE HYDROCHLORIDE 5 MG/ML
1 SOLUTION OPHTHALMIC ONCE
Status: COMPLETED | OUTPATIENT
Start: 2020-12-14 | End: 2020-12-14

## 2020-12-14 RX ADMIN — TETRACAINE HYDROCHLORIDE 1 DROP: 5 SOLUTION OPHTHALMIC at 21:06

## 2020-12-14 RX ADMIN — FLUORESCEIN SODIUM 1 MG: 1 STRIP OPHTHALMIC at 21:03

## 2020-12-14 RX ADMIN — ERYTHROMYCIN: 5 OINTMENT OPHTHALMIC at 21:03

## 2020-12-14 ASSESSMENT — PAIN SCALES - GENERAL: PAINLEVEL_OUTOF10: 5

## 2020-12-15 ENCOUNTER — HOSPITAL ENCOUNTER (EMERGENCY)
Age: 20
Discharge: HOME | End: 2020-12-16
Attending: EMERGENCY MEDICINE
Payer: COMMERCIAL

## 2020-12-15 PROCEDURE — 93005 ELECTROCARDIOGRAM TRACING: CPT | Performed by: EMERGENCY MEDICINE

## 2020-12-15 PROCEDURE — 96374 THER/PROPH/DIAG INJ IV PUSH: CPT

## 2020-12-15 PROCEDURE — 96375 TX/PRO/DX INJ NEW DRUG ADDON: CPT

## 2020-12-15 PROCEDURE — 99285 EMERGENCY DEPT VISIT HI MDM: CPT

## 2020-12-15 RX ORDER — 0.9 % SODIUM CHLORIDE 0.9 %
1000 INTRAVENOUS SOLUTION INTRAVENOUS ONCE
Status: COMPLETED | OUTPATIENT
Start: 2020-12-16 | End: 2020-12-16

## 2020-12-15 NOTE — ED PROVIDER NOTES
activity: Not on file   Other Topics Concern    Not on file   Social History Narrative    Not on file     Current Facility-Administered Medications   Medication Dose Route Frequency Provider Last Rate Last Admin    lidocaine-EPINEPHrine-tetracaine (LET) topical solution 3 mL syringe   Topical Once [facility], [place]        fluorescein ophthalmic strip 1 mg  1 [facility]        tetracaine (TETRAVISC) 0.5 % ophthalmic solution 1 drop  1 drop Right Eye Once [facility], [place]        erythromycin [facility]) ophthalmic ointment   Right Eye Once [place], [place]         Current Outpatient Medications   Medication Sig Dispense Refill    erythromycin (ROMYCIN) 5 MG/GM ophthalmic ointment Place 1 cm in eye twice daily.  1 Tube 0    naproxen (NAPROSYN) 500 MG tablet Take 1 tablet by mouth 2 times daily 60 tablet 0    acetaminophen (TYLENOL) 325 MG tablet Take 2 tablets by mouth every 6 hours as needed for Pain 120 tablet 3    ibuprofen (IBU) 600 MG tablet Take 1 tablet by mouth every 6 hours as needed for Pain 20 tablet 0    acetaminophen (TYLENOL) 500 MG tablet Take 1 tablet by mouth 4 times daily as needed for Pain 120 tablet 0    predniSONE (DELTASONE) 20 MG tablet Take 80mg (4 tablets) for 3 days  Then 60mg (3 tablets) for 3 days  Then 40mg (2 tablets for 3 days  Then 20mg (1 tablet) for 3 days 30 tablet 0    pantoprazole (PROTONIX) 40 MG tablet Take 1 tablet by mouth every morning (before breakfast) 12 tablet 0    White Petrolatum-Mineral Oil (ARTIFICIAL TEARS) 83-15 % Place into the right eye every evening 1 Tube 2    buPROPion (WELLBUTRIN SR) 150 MG extended release tablet Take 150 mg by mouth daily      acetaminophen-codeine (TYLENOL/CODEINE #3) 300-30 MG per tablet Take 1 tablet by mouth every 6 hours as needed for Pain 6 tablet 0    Dexmethylphenidate HCl ER (FOCALIN XR) 30 MG CP24 Take 30 mg by mouth 2 times daily       sertraline (ZOLOFT) 100 MG tablet Take 150 mg by mouth daily       hydrOXYzine (VISTARIL) 25 MG capsule Take 25 mg by mouth 3 times daily as needed for Itching       No Known Allergies    Nursing Notes Reviewed    Physical Exam:  ED Triage Vitals [12/14/20 1920]   Enc Vitals Group      BP       Pulse       Resp       Temp       Temp src       SpO2       Weight 165 lb (74.8 kg)      Height 5' 5\" (1.651 m)      Head Circumference       Peak Flow       Pain Score       Pain Loc       Pain Edu? Excl. in [address]? General :Patient is awake alert oriented person place and time no acute distress nontoxic appearing  HEENT: Pupils are equally round and reactive to light extraocular motors are intact conjunctivae clear sclerae white there is no injection no icterus. Nose without any rhinorrhea or epistaxis. Oral mucosa is moist no exudate buccal mucosa shows no ulcerations. Uvula is midline  \  EYE(S): Right  Visual acuity reviewed per the nurses chart. -OPHTHALMIC EXAM: PERRL. EOMI. Sclera non-icteric and non-injected. Flourescein shows small 1mm horizontal corneal abrasion. . No evidence of a Donis sign. Fundoscopic exam shows a clear vitrous humor and no papilledema. -SPLIT LAMP RIGHT: No flare or cells, no hypopyon and no hyphema. Sharon-orbital region non-erythematous and non-tender. Neck: Neck is supple full range of motion trachea midline thyroid nonpalpable  Cardiac: Heart regular rate rhythm no murmurs rubs clicks or gallops  Lungs: Lungs are clear to auscultation there is no wheezing rhonchi or rales. There is no use of accessory muscles no nasal flaring identified. Chest wall: There is no tenderness to palpation over the chest wall or over ribs  Abdomen: Abdomen is soft nontender nondistended.  There is no firm or pulsatile masses no rebound rigidity or guarding negative Mcghee's negative McBurney, no peritoneal signs  Suprapubic:  there is no tenderness to palpation over the external bladder         I have reviewed and errors related to that system including errors in grammar, punctuation, and spelling, as well as words and phrases that may be inappropriate. If there are any questions or concerns please feel free to contact the dictating provider for clarification.       Emmanuelle Corrales, [address] [place], [place]  12/14/20 2059

## 2020-12-15 NOTE — ED NOTES
Pt discharged home, reviewed discharge instructions, pt V/U     Compa Cunninghamo, RN  12/14/20 9005

## 2020-12-16 ENCOUNTER — APPOINTMENT (OUTPATIENT)
Dept: GENERAL RADIOLOGY | Age: 20
End: 2020-12-16
Payer: COMMERCIAL

## 2020-12-16 VITALS
BODY MASS INDEX: 27.49 KG/M2 | WEIGHT: 165 LBS | HEART RATE: 96 BPM | SYSTOLIC BLOOD PRESSURE: 122 MMHG | OXYGEN SATURATION: 98 % | TEMPERATURE: 101.4 F | HEIGHT: 65 IN | DIASTOLIC BLOOD PRESSURE: 67 MMHG | RESPIRATION RATE: 27 BRPM

## 2020-12-16 LAB
ACETAMINOPHEN LEVEL: <5 UG/ML (ref 15–30)
ADENOVIRUS DETECTION BY PCR: NOT DETECTED
ALBUMIN SERPL-MCNC: 4.4 GM/DL (ref 3.4–5)
ALP BLD-CCNC: 77 IU/L (ref 40–129)
ALT SERPL-CCNC: 10 U/L (ref 10–40)
AMPHETAMINES: NEGATIVE
ANION GAP SERPL CALCULATED.3IONS-SCNC: 11 MMOL/L (ref 4–16)
AST SERPL-CCNC: 14 IU/L (ref 15–37)
BACTERIA: ABNORMAL /HPF
BARBITURATE SCREEN URINE: NEGATIVE
BASOPHILS ABSOLUTE: 0.1 K/CU MM
BASOPHILS RELATIVE PERCENT: 0.3 % (ref 0–1)
BENZODIAZEPINE SCREEN, URINE: ABNORMAL
BILIRUB SERPL-MCNC: 0.3 MG/DL (ref 0–1)
BILIRUBIN DIRECT: 0.2 MG/DL (ref 0–0.3)
BILIRUBIN URINE: NEGATIVE MG/DL
BILIRUBIN, INDIRECT: 0.1 MG/DL (ref 0–0.7)
BLOOD, URINE: NEGATIVE
BORDETELLA PARAPERTUSSIS BY PCR: NOT DETECTED
BORDETELLA PERTUSSIS PCR: NOT DETECTED
BUN BLDV-MCNC: 10 MG/DL (ref 6–23)
CALCIUM SERPL-MCNC: 9.3 MG/DL (ref 8.3–10.6)
CANNABINOID SCREEN URINE: NEGATIVE
CHLAMYDOPHILA PNEUMONIA PCR: NOT DETECTED
CHLORIDE BLD-SCNC: 105 MMOL/L (ref 99–110)
CLARITY: ABNORMAL
CO2: 20 MMOL/L (ref 21–32)
COCAINE METABOLITE: NEGATIVE
COLOR: YELLOW
CORONAVIRUS 229E PCR: NOT DETECTED
CORONAVIRUS HKU1 PCR: NOT DETECTED
CORONAVIRUS NL63 PCR: NOT DETECTED
CORONAVIRUS OC43 PCR: NOT DETECTED
CREAT SERPL-MCNC: 0.6 MG/DL (ref 0.6–1.1)
DIFFERENTIAL TYPE: ABNORMAL
DOSE AMOUNT: ABNORMAL
DOSE AMOUNT: ABNORMAL
DOSE TIME: ABNORMAL
DOSE TIME: ABNORMAL
EOSINOPHILS ABSOLUTE: 0 K/CU MM
EOSINOPHILS RELATIVE PERCENT: 0.1 % (ref 0–3)
GFR AFRICAN AMERICAN: >60 ML/MIN/1.73M2
GFR NON-AFRICAN AMERICAN: >60 ML/MIN/1.73M2
GLUCOSE BLD-MCNC: 106 MG/DL (ref 70–99)
GLUCOSE, URINE: NEGATIVE MG/DL
HCT VFR BLD CALC: 40.2 % (ref 37–47)
HEMOGLOBIN: 13.8 GM/DL (ref 12.5–16)
HUMAN METAPNEUMOVIRUS PCR: NOT DETECTED
IMMATURE NEUTROPHIL %: 0.4 % (ref 0–0.43)
INFLUENZA A BY PCR: NOT DETECTED
INFLUENZA A H1 (2009) PCR: NOT DETECTED
INFLUENZA A H1 PANDEMIC PCR: NOT DETECTED
INFLUENZA A H3 PCR: NOT DETECTED
INFLUENZA B BY PCR: NOT DETECTED
INTERPRETATION: NORMAL
KETONES, URINE: NEGATIVE MG/DL
LACTATE: 1.7 MMOL/L (ref 0.4–2)
LEUKOCYTE ESTERASE, URINE: NEGATIVE
LYMPHOCYTES ABSOLUTE: 2.2 K/CU MM
LYMPHOCYTES RELATIVE PERCENT: 13.1 % (ref 24–44)
MCH RBC QN AUTO: 31.9 PG (ref 27–31)
MCHC RBC AUTO-ENTMCNC: 34.3 % (ref 32–36)
MCV RBC AUTO: 93.1 FL (ref 78–100)
MONOCYTES ABSOLUTE: 1.1 K/CU MM
MONOCYTES RELATIVE PERCENT: 6.4 % (ref 0–4)
MUCUS: ABNORMAL HPF
MYCOPLASMA PNEUMONIAE PCR: NOT DETECTED
NITRITE URINE, QUANTITATIVE: NEGATIVE
NUCLEATED RBC %: 0 %
OPIATES, URINE: NEGATIVE
OXYCODONE: NEGATIVE
PARAINFLUENZA 1 PCR: NOT DETECTED
PARAINFLUENZA 2 PCR: NOT DETECTED
PARAINFLUENZA 3 PCR: NOT DETECTED
PARAINFLUENZA 4 PCR: NOT DETECTED
PDW BLD-RTO: 12.2 % (ref 11.7–14.9)
PH, URINE: 6 (ref 5–8)
PHENCYCLIDINE, URINE: NEGATIVE
PLATELET # BLD: 266 K/CU MM (ref 140–440)
PMV BLD AUTO: 10.7 FL (ref 7.5–11.1)
POTASSIUM SERPL-SCNC: 3.8 MMOL/L (ref 3.5–5.1)
PREGNANCY, URINE: NEGATIVE
PROTEIN UA: NEGATIVE MG/DL
RAPID INFLUENZA  B AGN: NEGATIVE
RAPID INFLUENZA A AGN: NEGATIVE
RBC # BLD: 4.32 M/CU MM (ref 4.2–5.4)
RBC URINE: ABNORMAL /HPF (ref 0–6)
RHINOVIRUS ENTEROVIRUS PCR: NOT DETECTED
RSV PCR: NOT DETECTED
SALICYLATE LEVEL: <0.3 MG/DL (ref 15–30)
SARS-COV-2: NOT DETECTED
SEGMENTED NEUTROPHILS ABSOLUTE COUNT: 13.1 K/CU MM
SEGMENTED NEUTROPHILS RELATIVE PERCENT: 79.7 % (ref 36–66)
SODIUM BLD-SCNC: 136 MMOL/L (ref 135–145)
SPECIFIC GRAVITY UA: 1.02 (ref 1–1.03)
SPECIFIC GRAVITY, URINE: 1.02 (ref 1–1.03)
SQUAMOUS EPITHELIAL: 1 /HPF
TOTAL IMMATURE NEUTOROPHIL: 0.06 K/CU MM
TOTAL NUCLEATED RBC: 0 K/CU MM
TOTAL PROTEIN: 7.6 GM/DL (ref 6.4–8.2)
TRICHOMONAS: ABNORMAL /HPF
TSH HIGH SENSITIVITY: 1.49 UIU/ML (ref 0.27–4.2)
UROBILINOGEN, URINE: 1 MG/DL (ref 0.2–1)
WBC # BLD: 16.5 K/CU MM (ref 4–10.5)
WBC UA: 1 /HPF (ref 0–5)

## 2020-12-16 PROCEDURE — 71046 X-RAY EXAM CHEST 2 VIEWS: CPT

## 2020-12-16 PROCEDURE — 85025 COMPLETE CBC W/AUTO DIFF WBC: CPT

## 2020-12-16 PROCEDURE — 80307 DRUG TEST PRSMV CHEM ANLYZR: CPT

## 2020-12-16 PROCEDURE — 84443 ASSAY THYROID STIM HORMONE: CPT

## 2020-12-16 PROCEDURE — 6370000000 HC RX 637 (ALT 250 FOR IP): Performed by: EMERGENCY MEDICINE

## 2020-12-16 PROCEDURE — 81001 URINALYSIS AUTO W/SCOPE: CPT

## 2020-12-16 PROCEDURE — 83605 ASSAY OF LACTIC ACID: CPT

## 2020-12-16 PROCEDURE — G0480 DRUG TEST DEF 1-7 CLASSES: HCPCS

## 2020-12-16 PROCEDURE — 82248 BILIRUBIN DIRECT: CPT

## 2020-12-16 PROCEDURE — 96374 THER/PROPH/DIAG INJ IV PUSH: CPT

## 2020-12-16 PROCEDURE — 36415 COLL VENOUS BLD VENIPUNCTURE: CPT

## 2020-12-16 PROCEDURE — 0202U NFCT DS 22 TRGT SARS-COV-2: CPT

## 2020-12-16 PROCEDURE — 2580000003 HC RX 258: Performed by: EMERGENCY MEDICINE

## 2020-12-16 PROCEDURE — 6360000002 HC RX W HCPCS: Performed by: EMERGENCY MEDICINE

## 2020-12-16 PROCEDURE — 87804 INFLUENZA ASSAY W/OPTIC: CPT

## 2020-12-16 PROCEDURE — 96375 TX/PRO/DX INJ NEW DRUG ADDON: CPT

## 2020-12-16 PROCEDURE — 81025 URINE PREGNANCY TEST: CPT

## 2020-12-16 PROCEDURE — 80053 COMPREHEN METABOLIC PANEL: CPT

## 2020-12-16 RX ORDER — HYDROXYZINE HYDROCHLORIDE 25 MG/1
50 TABLET, FILM COATED ORAL EVERY 8 HOURS PRN
Qty: 30 TABLET | Refills: 0 | Status: SHIPPED | OUTPATIENT
Start: 2020-12-16 | End: 2020-12-26

## 2020-12-16 RX ORDER — HYDROXYZINE PAMOATE 25 MG/1
50 CAPSULE ORAL ONCE
Status: COMPLETED | OUTPATIENT
Start: 2020-12-16 | End: 2020-12-16

## 2020-12-16 RX ORDER — MIDAZOLAM HYDROCHLORIDE 2 MG/2ML
1 INJECTION, SOLUTION INTRAMUSCULAR; INTRAVENOUS ONCE
Status: COMPLETED | OUTPATIENT
Start: 2020-12-16 | End: 2020-12-16

## 2020-12-16 RX ORDER — 0.9 % SODIUM CHLORIDE 0.9 %
1000 INTRAVENOUS SOLUTION INTRAVENOUS ONCE
Status: COMPLETED | OUTPATIENT
Start: 2020-12-16 | End: 2020-12-16

## 2020-12-16 RX ORDER — ACETAMINOPHEN 500 MG
1000 TABLET ORAL ONCE
Status: COMPLETED | OUTPATIENT
Start: 2020-12-16 | End: 2020-12-16

## 2020-12-16 RX ORDER — KETOROLAC TROMETHAMINE 30 MG/ML
15 INJECTION, SOLUTION INTRAMUSCULAR; INTRAVENOUS ONCE
Status: COMPLETED | OUTPATIENT
Start: 2020-12-16 | End: 2020-12-16

## 2020-12-16 RX ADMIN — KETOROLAC TROMETHAMINE 15 MG: 30 INJECTION, SOLUTION INTRAMUSCULAR; INTRAVENOUS at 03:18

## 2020-12-16 RX ADMIN — ACETAMINOPHEN 1000 MG: 500 TABLET ORAL at 03:18

## 2020-12-16 RX ADMIN — HYDROXYZINE PAMOATE 50 MG: 25 CAPSULE ORAL at 03:18

## 2020-12-16 RX ADMIN — SODIUM CHLORIDE 1000 ML: 9 INJECTION, SOLUTION INTRAVENOUS at 03:18

## 2020-12-16 RX ADMIN — MIDAZOLAM 1 MG: 1 INJECTION INTRAMUSCULAR; INTRAVENOUS at 00:12

## 2020-12-16 RX ADMIN — SODIUM CHLORIDE 1000 ML: 9 INJECTION, SOLUTION INTRAVENOUS at 06:14

## 2020-12-16 RX ADMIN — SODIUM CHLORIDE 1000 ML: 9 INJECTION, SOLUTION INTRAVENOUS at 00:03

## 2020-12-16 ASSESSMENT — PAIN SCALES - GENERAL: PAINLEVEL_OUTOF10: 9

## 2020-12-16 NOTE — ED NOTES
Bed: 04TR-04  Expected date:   Expected time:   Means of arrival:   Comments:  Medic ENDY Anderson, [address] [facility]  12/15/20 5410

## 2020-12-16 NOTE — ED PROVIDER NOTES
 pantoprazole (PROTONIX) 40 MG tablet Take 1 tablet by mouth every morning (before breakfast) 12 tablet 0    White Petrolatum-Mineral Oil (ARTIFICIAL TEARS) 83-15 % Place into the right eye every evening 1 Tube 2    buPROPion (WELLBUTRIN SR) 150 MG extended release tablet Take 150 mg by mouth daily      acetaminophen-codeine (TYLENOL/CODEINE #3) 300-30 MG per tablet Take 1 tablet by mouth every 6 hours as needed for Pain 6 tablet 0    Dexmethylphenidate HCl ER (FOCALIN XR) 30 MG CP24 Take 30 mg by mouth 2 times daily       sertraline (ZOLOFT) 100 MG tablet Take 150 mg by mouth daily       hydrOXYzine (VISTARIL) 25 MG capsule Take 25 mg by mouth 3 times daily as needed for Itching       No Known Allergies    Nursing Notes Reviewed    Physical Exam:  Triage VS:    ED Triage Vitals   Enc Vitals Group      BP 12/16/20 0005 (!) 147/62      Pulse 12/16/20 0000 137      Resp 12/16/20 0005 29      Temp 12/16/20 0332 101.4 °F (38.6 °C)      Temp src --       SpO2 12/16/20 0005 98 %      Weight 12/16/20 0335 165 lb (74.8 kg)      Height 12/16/20 0335 5' 5\" (1.651 m)      Head Circumference --       Peak Flow --       Pain Score --       Pain Loc --       Pain Edu? --       Excl. in [address]? --        My pulse ox interpretation is - normal    General appearance:paiviky appears very anxious and is moaning/ writhing on bed with eyes closed  Skin:  Warm. Dry. Eye:  Extraocular movements intact. Ears, nose, mouth and throat:  Oral mucosa moist, tympanic membranes without evidence of otitis media, posterior pharynx with erythema but no exudate, nasal congestion noted   Neck:  Trachea midline. No palpable tender lymphadenopathy  Extremity:   Normal ROM     Heart:  Tachycardic, regular  Perfusion:  intact  Respiratory: mild tachypnea/hyperventilation;  Lungs clear to auscultation bilaterally. Respirations nonlabored. Abdominal:  Normal bowel sounds. Soft. Nontender. Non distended.           Neurological:  Alert and 140 - 440 K/CU MM    MPV 10.7 7.5 - 11.1 FL    Differential Type AUTOMATED DIFFERENTIAL     Segs Relative 79.7 (H) 36 - 66 %    Lymphocytes % 13.1 (L) 24 - 44 %    Monocytes % 6.4 (H) 0 - 4 %    Eosinophils % 0.1 0 - 3 %    Basophils % 0.3 0 - 1 %    Segs Absolute 13.1 K/CU MM    Lymphocytes Absolute 2.2 K/CU MM    Monocytes Absolute 1.1 K/CU MM    Eosinophils Absolute 0.0 K/CU MM    Basophils Absolute 0.1 K/CU MM    Nucleated RBC % 0.0 %    Total Nucleated RBC 0.0 K/CU MM    Total Immature Neutrophil 0.06 K/CU MM    Immature Neutrophil % 0.4 0 - 0.43 %   Basic Metabolic Panel w/ Reflex to MG   Result Value Ref Range    Sodium 136 135 - 145 MMOL/L    Potassium 3.8 3.5 - 5.1 MMOL/L    Chloride 105 99 - 110 mMol/L    CO2 20 (L) 21 - 32 MMOL/L    Anion Gap 11 4 - 16    BUN 10 6 - 23 MG/DL    CREATININE 0.6 0.6 - 1.1 MG/DL    Glucose 106 (H) 70 - 99 MG/DL    Calcium 9.3 8.3 - 10.6 MG/DL    GFR Non-African American >60 >60 mL/min/1.73m2    GFR African American >60 >60 mL/min/1.73m2   Hepatic Function Panel   Result Value Ref Range    Alb 4.4 3.4 - 5.0 GM/DL    Total Bilirubin 0.3 0.0 - 1.0 MG/DL    Bilirubin, Direct 0.2 0.0 - 0.3 MG/DL    Bilirubin, Indirect 0.1 0 - 0.7 MG/DL    Alkaline Phosphatase 77 40 - 129 IU/L    AST 14 (L) 15 - 37 IU/L    ALT 10 10 - 40 U/L    Total Protein 7.6 6.4 - 8.2 GM/DL   Lactic Acid, Plasma   Result Value Ref Range    Lactate 1.7 0.4 - 2.0 mMOL/L   Drug screen multi urine   Result Value Ref Range    Cannabinoid Scrn, Ur NEGATIVE NEGATIVE    Amphetamines NEGATIVE NEGATIVE    Cocaine Metabolite NEGATIVE NEGATIVE    Benzodiazepine Screen, Urine UNCONFIRMED POSITIVE (A) NEGATIVE    Barbiturate Screen, Ur NEGATIVE NEGATIVE    Opiates, Urine NEGATIVE NEGATIVE    Phencyclidine, Urine NEGATIVE NEGATIVE    Oxycodone NEGATIVE NEGATIVE   Acetaminophen Level   Result Value Ref Range    Acetaminophen Level <5.0 (L) 15 - 30 ug/ml    DOSE AMOUNT DOSE AMT.  GIVEN - UNKNOWN     DOSE TIME DOSE TIME GIVEN - UNKNOWN    TSH without Reflex   Result Value Ref Range    TSH, High Sensitivity 1.490 0.270 - 0.62 uIu/ml   Salicylate Level   Result Value Ref Range    Salicylate Lvl <4.2 (L) 15 - 30 MG/DL    DOSE AMOUNT DOSE AMT. GIVEN - UNKNOWN     DOSE TIME DOSE TIME GIVEN - UNKNOWN    HCG, Urine, Qualitative, Pregnancy (Lab)   Result Value Ref Range    Pregnancy, Urine NEGATIVE NEGATIVE    Specific Gravity, Urine 1.020 1.001 - 1.035    Interpretation HCG METHOD LIMITATIONS:    Urinalysis with microscopic   Result Value Ref Range    Color, UA YELLOW YELLOW    Clarity, UA HAZY (A) CLEAR    Glucose, Urine NEGATIVE NEGATIVE MG/DL    Bilirubin Urine NEGATIVE NEGATIVE MG/DL    Ketones, Urine NEGATIVE NEGATIVE MG/DL    Specific Gravity, UA 1.021 1.001 - 1.035    Blood, Urine NEGATIVE NEGATIVE    pH, Urine 6.0 5.0 - 8.0    Protein, UA NEGATIVE NEGATIVE MG/DL    Urobilinogen, Urine 1 0.2 - 1.0 MG/DL    Nitrite Urine, Quantitative NEGATIVE NEGATIVE    Leukocyte Esterase, Urine NEGATIVE NEGATIVE    RBC, UA NONE SEEN 0 - 6 /HPF    WBC, UA 1 0 - 5 /HPF    Bacteria, UA FEW (A) NEGATIVE /HPF    Squam Epithel, UA 1 /HPF    Mucus, UA RARE (A) NEGATIVE HPF    Trichomonas, UA NONE SEEN NONE SEEN /HPF      Radiographs (if obtained):  Radiologist's Report Reviewed:  Xr Chest (2 Vw)    Result Date: 12/16/2020  EXAMINATION: TWO XRAY VIEWS OF THE CHEST 12/16/2020 12:43 am COMPARISON: 02/13/2020 HISTORY: ORDERING SYSTEM PROVIDED HISTORY: tachycardia TECHNOLOGIST PROVIDED HISTORY: Reason for exam:->tachycardia Reason for Exam: tachycardia Acuity: Acute Type of Exam: Initial FINDINGS: The cardiac silhouette appears within normal limits for size given portable technique. No convincing evidence of a focal consolidation. No pleural effusion or pneumothorax seen. No acute cardiopulmonary abnormality.      EKG  140  Sinus  Normals intervals   Normal axis  No specific st or t wave changes      MDM:  Patient presenting via EMS with tachycardia in the 140's, anxiety, diffuse myalgias. She was initially hyperventilating. She was placed on a monitor and she was calmed down with use of breathing exercises. She was given Versed 1 mg which did completely resolve her anxiety. EKG showed sinus tachycardia. She reported diffuse muscle aches and was found to have an elevated temperature. She was given Acetaminophen 1000 mg and Toradol 15 mg with some return of her anxiety and was given Hydroxyzine as well. She was also given 3 liters of NS. On follow up she was noted to have improvement of her HR and she reported feeling much better. She most likely has a viral illness possibly COVID-19; however the rapid test was negative along with her viral panel and Influenza. Her chest x-ray showed no cardiopulmonary process and she had no signs of infection in her urine. I discussed her presentation, workup, findings and ED treatment with her at great length. I recommended OTC Acetaminophen and Ibuprofen for general malaise, fever, chills and myalgias. Patient understands that at this time there is no evidence for another underlying process, however that early in the process of any illness or infection an initial workup/presentation can be falsely reassuring/negative. Based on history, physical exam and discussion with patient, the patient will be treated symptomatically and will be discharged home. Patient was instructed on symptomatic treatment, monitoring and outpatient followup. They understand and agree with the plan, return warnings given. We have discussed the symptoms which are most concerning that necessitate immediate return. Clinical Impression:  1. Anxiety    2.  Viral illness      Disposition referral (if applicable):  Cher Peng MD  [address]   [phone]    Call   Call to make appointment to discuss further recommendations and for close follow-up    Disposition medications (if applicable):  New Prescriptions    HYDROXYZINE (ATARAX) 25 MG TABLET    Take 2 tablets by mouth every 8 hours as needed for Anxiety     ED Provider Disposition Time  DISPOSITION Decision To Discharge 12/16/2020 06:12:31 AM    You were seen in the emergency department today for an elevated heart rate and anxiety. You most likely have a viral illness. It could possibly be due to COVID-19 although a test in the emergency department was negative. I do think that you should still self isolate. Your symptoms were most likely exacerbated by your anxiety; please use Hydroxyzine as directed. - Please stay well-hydrated. - I recommend Acetaminophen 1000 mg every 8 hours as needed for general malaise and fever.    - You can also take Ibuprofen 600 mg every 8 hours as needed for general malaise and fever. You may take Ibuprofen and Acetaminophen together; however, do not take Ibuprofen with any other nonsteroidal anti-inflammatory. Do not take more than 4000 mg of acetaminophen in a 24-hour period. Comment: Please note this report has been produced using speech recognition software and may contain errors related to that system including errors in grammar, punctuation, and spelling, as well as words and phrases that may be inappropriate. Efforts were made to edit the dictations.         Tala Galvan MD  12/17/20 0404       Tala Galvan MD  12/17/20 3946

## 2020-12-16 NOTE — ED NOTES
[facility]. Care assumed at this time. Resting comfortably in bed with no signs of active distress. resps even and unlabored.       Nyla Cruz RN  12/16/20 8569

## 2020-12-16 NOTE — ED NOTES
Patient given discharge instructions at this time. Verbalizes understanding and denies any further needs, questions, concerns or complaints. Patient ambulatory to front of ER in standing, up position in stable condition.       Eleanor Copeland RN  12/16/20 1007

## 2020-12-17 LAB
EKG ATRIAL RATE: 134 BPM
EKG DIAGNOSIS: NORMAL
EKG P AXIS: 30 DEGREES
EKG P-R INTERVAL: 124 MS
EKG Q-T INTERVAL: 272 MS
EKG QRS DURATION: 74 MS
EKG QTC CALCULATION (BAZETT): 406 MS
EKG R AXIS: 90 DEGREES
EKG T AXIS: 24 DEGREES
EKG VENTRICULAR RATE: 134 BPM

## 2020-12-17 PROCEDURE — 93010 ELECTROCARDIOGRAM REPORT: CPT | Performed by: INTERNAL MEDICINE

## 2021-05-06 ENCOUNTER — HOSPITAL ENCOUNTER (EMERGENCY)
Age: 21
Discharge: HOME | End: 2021-05-06
Attending: EMERGENCY MEDICINE
Payer: COMMERCIAL

## 2021-05-06 VITALS
HEART RATE: 101 BPM | TEMPERATURE: 98.9 F | RESPIRATION RATE: 20 BRPM | OXYGEN SATURATION: 98 % | DIASTOLIC BLOOD PRESSURE: 53 MMHG | SYSTOLIC BLOOD PRESSURE: 107 MMHG

## 2021-05-06 DIAGNOSIS — F41.9 ANXIETY: Primary | ICD-10-CM

## 2021-05-06 LAB
EKG ATRIAL RATE: 125 BPM
EKG DIAGNOSIS: NORMAL
EKG P AXIS: 52 DEGREES
EKG P-R INTERVAL: 126 MS
EKG Q-T INTERVAL: 280 MS
EKG QRS DURATION: 70 MS
EKG QTC CALCULATION (BAZETT): 404 MS
EKG R AXIS: 88 DEGREES
EKG T AXIS: -5 DEGREES
EKG VENTRICULAR RATE: 125 BPM

## 2021-05-06 PROCEDURE — 93005 ELECTROCARDIOGRAM TRACING: CPT | Performed by: EMERGENCY MEDICINE

## 2021-05-06 PROCEDURE — 99283 EMERGENCY DEPT VISIT LOW MDM: CPT

## 2021-05-06 PROCEDURE — 6360000002 HC RX W HCPCS: Performed by: EMERGENCY MEDICINE

## 2021-05-06 PROCEDURE — 93010 ELECTROCARDIOGRAM REPORT: CPT | Performed by: INTERNAL MEDICINE

## 2021-05-06 PROCEDURE — 96372 THER/PROPH/DIAG INJ SC/IM: CPT

## 2021-05-06 RX ORDER — LORAZEPAM 2 MG/ML
2 INJECTION INTRAMUSCULAR ONCE
Status: COMPLETED | OUTPATIENT
Start: 2021-05-06 | End: 2021-05-06

## 2021-05-06 RX ORDER — HYDROXYZINE PAMOATE 25 MG/1
50 CAPSULE ORAL 3 TIMES DAILY PRN
Qty: 15 CAPSULE | Refills: 0 | Status: SHIPPED | OUTPATIENT
Start: 2021-05-06 | End: 2021-05-20

## 2021-05-06 RX ORDER — DIPHENHYDRAMINE HYDROCHLORIDE 50 MG/ML
25 INJECTION INTRAMUSCULAR; INTRAVENOUS ONCE
Status: COMPLETED | OUTPATIENT
Start: 2021-05-06 | End: 2021-05-06

## 2021-05-06 RX ADMIN — LORAZEPAM 2 MG: 2 INJECTION INTRAMUSCULAR; INTRAVENOUS at 01:02

## 2021-05-06 RX ADMIN — DIPHENHYDRAMINE HYDROCHLORIDE 25 MG: 50 INJECTION INTRAMUSCULAR; INTRAVENOUS at 01:02

## 2021-05-06 NOTE — ED PROVIDER NOTES
60mg (3 tablets) for 3 days  Then 40mg (2 tablets for 3 days  Then 20mg (1 tablet) for 3 days    SERTRALINE (ZOLOFT) 100 MG TABLET    Take 150 mg by mouth daily     WHITE PETROLATUM-MINERAL OIL (ARTIFICIAL TEARS) 83-15 %    Place into the right eye every evening     ALLERGIES  No Known Allergies    Nursing notes reviewed by myself for past medical history, family history, social history, surgical history, current medications, and allergies. PHYSICAL EXAM  VITAL SIGNS: Triage VS:    ED Triage Vitals   Enc Vitals Group      BP       Pulse       Resp       Temp       Temp src       SpO2       Weight       Height       Head Circumference       Peak Flow       Pain Score       Pain Loc       Pain Edu? Excl. in [address]? Constitutional: Well developed, Well nourished, nontoxic appearing  HENT: Normocephalic, Atraumatic, Bilateral external ears normal, Oropharynx moist, No oral exudates, Nose normal.   Eyes: PERRL, EOMI, Conjunctiva normal, No discharge. No scleral icterus. Neck: Normal range of motion, No tenderness, Supple. Lymphatic: No lymphadenopathy noted. Cardiovascular: Tachycardic, Normal rhythm, No murmurs, gallops or rubs. Thorax & Lungs: Normal breath sounds, No respiratory distress, No wheezing. Abdomen: Soft, No tenderness, No masses, No pulsatile masses, No distention, Normal bowel sounds  Skin: Warm, Dry, Pink, No mottling, No erythema, No rash. Back: No tenderness, No CVA tenderness. Extremities: No edema, No tenderness, No cyanosis, Normal perfusion, No clubbing. Musculoskeletal: Good range of motion in all major joints as observed. No major deformities noted. Neurologic: Alert & oriented x 3, Normal motor function, Normal sensory function, CN II-XII grossly intact as tested, No focal deficits noted. Psychiatric: Anxious and tearful, cooperative  EKG  Per my interpretation demonstrates sinus tachycardia at a rate of 125 bpm.  Normal axis. Normal intervals.   No acute ST segment changes. RADIOLOGY  Labs Reviewed - No data to display  I personally reviewed the images. The radiologist's interpretation reveals:  Last Imaging results   No orders to display     Procedures  NA  MEDS GIVEN IN ED:  Medications   diphenhydrAMINE (BENADRYL) injection 25 mg (25 mg Intramuscular Given 5/6/21 0102)   LORazepam (ATIVAN) injection 2 mg (2 mg Intramuscular Given 5/6/21 0102)     COURSE & MEDICAL DECISION MAKING  25-year-old female presents emergency department complaints of shaking all over and feeling anxious this evening. Initial vital signs remarkable for tachycardia rate of 105 and respiratory of 22. On exam she appears very anxious and tearful. She is redirectable. No homicidal or suicidal thoughts. At this time we will provide her with Ativan and Benadryl for symptomatic control of her anxiety. Patient had resolution of symptoms with the aforementioned inventions. Given reassuring work-up I feel she is appropriate for discharge home. Provided with a prescription for Vistaril. Instructed to follow-up with primary care provider. Return precautions provided. Appropriate PPE utilized as indicated for entire patient encounter? Time of Disposition: See timeline  ? New Prescriptions    HYDROXYZINE (VISTARIL) 25 MG CAPSULE    Take 2 capsules by mouth 3 times daily as needed for Anxiety     FINAL IMPRESSION  1. Anxiety        Electronically signed by:  [address], DO, 5/6/2021         [address] Rami[address], DO  05/06/21 7862

## 2021-05-06 NOTE — ED NOTES
Pt was at home \"chillin\" when she started to feel warm, laid down but then started shaking and feeling anxious. Pt reports this happened one other time in December. She was seen here then.    Pt's temp here is 100.0     Torres Pinto, [facility]  05/06/21 0110

## 2021-07-19 ENCOUNTER — APPOINTMENT (OUTPATIENT)
Dept: GENERAL RADIOLOGY | Age: 21
End: 2021-07-19
Payer: COMMERCIAL

## 2021-07-19 ENCOUNTER — HOSPITAL ENCOUNTER (EMERGENCY)
Age: 21
Discharge: HOME | End: 2021-07-19
Attending: EMERGENCY MEDICINE
Payer: COMMERCIAL

## 2021-07-19 VITALS
WEIGHT: 175 LBS | HEIGHT: 65 IN | HEART RATE: 78 BPM | TEMPERATURE: 98.8 F | RESPIRATION RATE: 16 BRPM | SYSTOLIC BLOOD PRESSURE: 105 MMHG | OXYGEN SATURATION: 100 % | BODY MASS INDEX: 29.16 KG/M2 | DIASTOLIC BLOOD PRESSURE: 60 MMHG

## 2021-07-19 DIAGNOSIS — M79.10 MYALGIA: ICD-10-CM

## 2021-07-19 DIAGNOSIS — T88.1XXA POSTIMMUNIZATION REACTION, INITIAL ENCOUNTER: Primary | ICD-10-CM

## 2021-07-19 DIAGNOSIS — R11.0 NAUSEA: ICD-10-CM

## 2021-07-19 LAB
ANION GAP SERPL CALCULATED.3IONS-SCNC: 9 MMOL/L (ref 4–16)
BASOPHILS ABSOLUTE: 0 K/CU MM
BASOPHILS RELATIVE PERCENT: 0.5 % (ref 0–1)
BUN BLDV-MCNC: 9 MG/DL (ref 6–23)
CALCIUM SERPL-MCNC: 9.6 MG/DL (ref 8.3–10.6)
CHLORIDE BLD-SCNC: 99 MMOL/L (ref 99–110)
CO2: 27 MMOL/L (ref 21–32)
CREAT SERPL-MCNC: 0.5 MG/DL (ref 0.6–1.1)
DIFFERENTIAL TYPE: ABNORMAL
EOSINOPHILS ABSOLUTE: 0 K/CU MM
EOSINOPHILS RELATIVE PERCENT: 0.7 % (ref 0–3)
GFR AFRICAN AMERICAN: >60 ML/MIN/1.73M2
GFR NON-AFRICAN AMERICAN: >60 ML/MIN/1.73M2
GLUCOSE BLD-MCNC: 102 MG/DL (ref 70–99)
HCT VFR BLD CALC: 41.4 % (ref 37–47)
HEMOGLOBIN: 13.9 GM/DL (ref 12.5–16)
IMMATURE NEUTROPHIL %: 0.2 % (ref 0–0.43)
LYMPHOCYTES ABSOLUTE: 2.6 K/CU MM
LYMPHOCYTES RELATIVE PERCENT: 42.3 % (ref 24–44)
MCH RBC QN AUTO: 32 PG (ref 27–31)
MCHC RBC AUTO-ENTMCNC: 33.6 % (ref 32–36)
MCV RBC AUTO: 95.2 FL (ref 78–100)
MONOCYTES ABSOLUTE: 0.5 K/CU MM
MONOCYTES RELATIVE PERCENT: 8.7 % (ref 0–4)
NUCLEATED RBC %: 0 %
PDW BLD-RTO: 13.3 % (ref 11.7–14.9)
PLATELET # BLD: 236 K/CU MM (ref 140–440)
PMV BLD AUTO: 11 FL (ref 7.5–11.1)
POTASSIUM SERPL-SCNC: 4 MMOL/L (ref 3.5–5.1)
RBC # BLD: 4.35 M/CU MM (ref 4.2–5.4)
SEGMENTED NEUTROPHILS ABSOLUTE COUNT: 2.9 K/CU MM
SEGMENTED NEUTROPHILS RELATIVE PERCENT: 47.6 % (ref 36–66)
SODIUM BLD-SCNC: 135 MMOL/L (ref 135–145)
TOTAL IMMATURE NEUTOROPHIL: 0.01 K/CU MM
TOTAL NUCLEATED RBC: 0 K/CU MM
TROPONIN T: <0.01 NG/ML
WBC # BLD: 6.1 K/CU MM (ref 4–10.5)

## 2021-07-19 PROCEDURE — 85025 COMPLETE CBC W/AUTO DIFF WBC: CPT

## 2021-07-19 PROCEDURE — 80048 BASIC METABOLIC PNL TOTAL CA: CPT

## 2021-07-19 PROCEDURE — 84484 ASSAY OF TROPONIN QUANT: CPT

## 2021-07-19 PROCEDURE — 6370000000 HC RX 637 (ALT 250 FOR IP): Performed by: EMERGENCY MEDICINE

## 2021-07-19 PROCEDURE — 71046 X-RAY EXAM CHEST 2 VIEWS: CPT

## 2021-07-19 PROCEDURE — 99283 EMERGENCY DEPT VISIT LOW MDM: CPT

## 2021-07-19 PROCEDURE — 36415 COLL VENOUS BLD VENIPUNCTURE: CPT

## 2021-07-19 PROCEDURE — 93005 ELECTROCARDIOGRAM TRACING: CPT | Performed by: EMERGENCY MEDICINE

## 2021-07-19 RX ORDER — IBUPROFEN 600 MG/1
600 TABLET ORAL ONCE
Status: COMPLETED | OUTPATIENT
Start: 2021-07-19 | End: 2021-07-19

## 2021-07-19 RX ORDER — METHOCARBAMOL 500 MG/1
500 TABLET, FILM COATED ORAL 4 TIMES DAILY
Qty: 40 TABLET | Refills: 0 | Status: SHIPPED | OUTPATIENT
Start: 2021-07-19 | End: 2021-07-29

## 2021-07-19 RX ORDER — IBUPROFEN 600 MG/1
600 TABLET ORAL 3 TIMES DAILY PRN
Qty: 30 TABLET | Refills: 0 | Status: SHIPPED | OUTPATIENT
Start: 2021-07-19 | End: 2021-10-04

## 2021-07-19 RX ORDER — ONDANSETRON 4 MG/1
4 TABLET, ORALLY DISINTEGRATING ORAL ONCE
Status: COMPLETED | OUTPATIENT
Start: 2021-07-19 | End: 2021-07-19

## 2021-07-19 RX ORDER — ONDANSETRON 4 MG/1
4 TABLET, ORALLY DISINTEGRATING ORAL EVERY 8 HOURS PRN
Qty: 15 TABLET | Refills: 0 | Status: SHIPPED | OUTPATIENT
Start: 2021-07-19 | End: 2021-12-05

## 2021-07-19 RX ADMIN — IBUPROFEN 600 MG: 600 TABLET ORAL at 23:20

## 2021-07-19 RX ADMIN — ONDANSETRON 4 MG: 4 TABLET, ORALLY DISINTEGRATING ORAL at 23:20

## 2021-07-19 ASSESSMENT — PAIN SCALES - GENERAL: PAINLEVEL_OUTOF10: 7

## 2021-07-20 LAB
EKG ATRIAL RATE: 82 BPM
EKG DIAGNOSIS: NORMAL
EKG P AXIS: 35 DEGREES
EKG P-R INTERVAL: 124 MS
EKG Q-T INTERVAL: 350 MS
EKG QRS DURATION: 78 MS
EKG QTC CALCULATION (BAZETT): 408 MS
EKG R AXIS: 80 DEGREES
EKG T AXIS: 35 DEGREES
EKG VENTRICULAR RATE: 82 BPM

## 2021-07-20 PROCEDURE — 93010 ELECTROCARDIOGRAM REPORT: CPT | Performed by: INTERNAL MEDICINE

## 2021-07-20 NOTE — ED PROVIDER NOTES
As physician-in-triage, I performed a medical screening history and physical exam on this patient. HISTORY OF PRESENT ILLNESS  Jose Manuel Cormier is a 23 y.o. female presents with myalgias, fatigue, nausea. She received her first Covid vaccine 2 days ago. She denies any fevers, but has some chest aching. She also has some bruising on her bilateral arms. PHYSICAL EXAM  /60   Pulse 78   Temp 98.8 °F (37.1 °C) (Oral)   Resp 16   Ht 5' 5\" (1.651 m)   Wt 175 lb (79.4 kg)   SpO2 100%   BMI 29.12 kg/m²     On exam, the patient appears in no acute distress. Speech is clear. Breathing is unlabored. Moves all extremities    Comment: Please note this report has been produced using speech recognition software and may contain errors related to that system including errors in grammar, punctuation, and spelling, as well as words and phrases that may be inappropriate. If there are any questions or concerns please feel free to contact the dictating provider for clarification. Charmayne Morita, MD  07/19/21 2031    CHIEF COMPLAINT  Chief Complaint   Patient presents with    Allergic Reaction     fron the covid shot 2 days ago       HPI  Jose Manuel Cormier is a 23 y.o. female with history of relative previous health who presents body aches, myalgias and fatigue over the past 2 days. She received her Covid vaccine 3 days ago, the initial day after she was fatigued but since this morning she has had bilateral arm pain, leg pain and states \"it feels like I got hit by a truck\". She is worried that she is having reaction to the vaccine. She denies any shortness of breath but she also some aching chest pain, some headache, some nausea. No measured fever. Symptoms moderate and persistent, no medications taken prior to arrival for improvement. Denies any chance of pregnancy. She is also noted some bruising on her bilateral forearms for 1 day and wonders if this is related.   She does work as a michael. REVIEW OF SYSTEMS  Review of Systems   History obtained from chart review and the patient  General ROS: negative for - fever  Ophthalmic ROS: negative for - decreased vision or double vision  ENT ROS: negative for - headaches  Hematological and Lymphatic ROS: negative for - weight loss  Endocrine ROS: negative for - unexpected weight changes  Respiratory ROS: no cough, shortness of breath, or wheezing  Cardiovascular ROS: positive for - chest pain  Gastrointestinal ROS: no abdominal pain, change in bowel habits, or black or bloody stools  Genito-Urinary ROS: no dysuria, trouble voiding, or hematuria  Musculoskeletal ROS: Positive for myalgias  Neurological ROS: no TIA or stroke symptoms      PAST MEDICAL HISTORY  Past Medical History:   Diagnosis Date    ADHD (attention deficit hyperactivity disorder)        FAMILY HISTORY  History reviewed. No pertinent family history. SOCIAL HISTORY  Social History     Socioeconomic History    Marital status: Single     Spouse name: None    Number of children: None    Years of education: None    Highest education level: None   Occupational History    None   Tobacco Use    Smoking status: Current Some Day Smoker     Packs/day: 0.50    Smokeless tobacco: Never Used   Substance and Sexual Activity    Alcohol use: No    Drug use: No    Sexual activity: None   Other Topics Concern    None   Social History Narrative    None     Social Determinants of Health     Financial Resource Strain:     Difficulty of Paying Living Expenses:    Food Insecurity:     Worried About Running Out of Food in the Last Year:     Ran Out of Food in the Last Year:    Transportation Needs:     Lack of Transportation (Medical):      Lack of Transportation (Non-Medical):    Physical Activity:     Days of Exercise per Week:     Minutes of Exercise per Session:    Stress:     Feeling of Stress :    Social Connections:     Frequency of Communication with Friends and Family:     Frequency of Social Gatherings with Friends and Family:     Attends Taoist Services:     Active Member of Clubs or Organizations:     Attends Club or Organization Meetings:     Marital Status:    Intimate Partner Violence:     Fear of Current or Ex-Partner:     Emotionally Abused:     Physically Abused:     Sexually Abused:        SURGICAL HISTORY  History reviewed. No pertinent surgical history. CURRENT MEDICATIONS  No current facility-administered medications on file prior to encounter.      Current Outpatient Medications on File Prior to Encounter   Medication Sig Dispense Refill    naproxen (NAPROSYN) 500 MG tablet Take 1 tablet by mouth 2 times daily 60 tablet 0    acetaminophen (TYLENOL) 325 MG tablet Take 2 tablets by mouth every 6 hours as needed for Pain 120 tablet 3    acetaminophen (TYLENOL) 500 MG tablet Take 1 tablet by mouth 4 times daily as needed for Pain 120 tablet 0    predniSONE (DELTASONE) 20 MG tablet Take 80mg (4 tablets) for 3 days  Then 60mg (3 tablets) for 3 days  Then 40mg (2 tablets for 3 days  Then 20mg (1 tablet) for 3 days 30 tablet 0    pantoprazole (PROTONIX) 40 MG tablet Take 1 tablet by mouth every morning (before breakfast) 12 tablet 0    White Petrolatum-Mineral Oil (ARTIFICIAL TEARS) 83-15 % Place into the right eye every evening 1 Tube 2    buPROPion (WELLBUTRIN SR) 150 MG extended release tablet Take 150 mg by mouth daily      acetaminophen-codeine (TYLENOL/CODEINE #3) 300-30 MG per tablet Take 1 tablet by mouth every 6 hours as needed for Pain 6 tablet 0    Dexmethylphenidate HCl ER (FOCALIN XR) 30 MG CP24 Take 30 mg by mouth 2 times daily       sertraline (ZOLOFT) 100 MG tablet Take 150 mg by mouth daily            ALLERGIES  No Known Allergies    PHYSICAL EXAM  VITAL SIGNS: /60   Pulse 78   Temp 98.8 °F (37.1 °C) (Oral)   Resp 16   Ht 5' 5\" (1.651 m)   Wt 175 lb (79.4 kg)   SpO2 100%   BMI 29.12 kg/m²   Constitutional: Well developed, Well nourished, resting in bed, active, ambulates with nonantalgic gait  HENT: Normocephalic, Atraumatic, Bilateral external ears normal, Oropharynx moist, No oral exudates, Nose normal.   Eyes: PERRL, EOMI, Conjunctiva normal, No discharge. Neck: Normal range of motion, Supple, No stridor. Cardiovascular: Normal heart rate, Normal rhythm, No murmurs, No rubs, No gallops. Thorax & Lungs: Normal breath sounds, No respiratory distress, No wheezing, No chest tenderness. Abdomen: Bowel sounds normal, Soft, No tenderness, no guarding, no rebound, No masses, No pulsatile masses. Skin: Warm, Dry, No erythema, No rash. Mild bruising bilateral anterior forearms   extremities: Intact distal pulses, No edema, No tenderness, No cyanosis, No clubbing. Musculoskeletal: Good gross range of motion in all major joints. No major deformities noted. Neurologic: Alert & oriented x 3, Normal gross motor function, Normal gross sensory function, No focal deficits noted. Psychiatric: Affect normal    EKG  EKG Interpretation    Interpreted by emergency department physician    EKG without evidence of ischemia or ectopy    RADIOLOGY/PROCEDURES/LABS  Last Imaging results   XR CHEST (2 VW)   Final Result   No acute cardiopulmonary findings. Imaging reviewed by myself    Labs Reviewed   CBC WITH AUTO DIFFERENTIAL - Abnormal; Notable for the following components:       Result Value    MCH 32.0 (*)     Monocytes % 8.7 (*)     All other components within normal limits   BASIC METABOLIC PANEL W/ REFLEX TO MG FOR LOW K - Abnormal; Notable for the following components:    CREATININE 0.5 (*)     Glucose 102 (*)     All other components within normal limits   TROPONIN         Medications   ibuprofen (ADVIL;MOTRIN) tablet 600 mg (600 mg Oral Given 7/19/21 2320)   ondansetron (ZOFRAN-ODT) disintegrating tablet 4 mg (4 mg Oral Given 7/19/21 2320)       COURSE & MEDICAL DECISION MAKING  Pertinent Labs & Imaging studies reviewed.  (See chart for details)    78-year-old female presents with myalgias, fatigue, likely having postimmunization reaction as she is having nonspecific immune response with myalgias, fatigue, headache, chest discomfort, nausea. She is nontoxic with reassuring laboratory evaluation and reassuring chest x-ray. She is hemodynamically stable. She will be treated symptomatically for her signs and symptoms and referred for outpatient recheck. There is no suggestion of rhabdomyolysis, ACS, PE. She does not have any platelet derangement or other concerns for abnormal bleeding. She will be discharged to follow recheck, strict return precautions put into place. FINAL IMPRESSION  Problem List Items Addressed This Visit     None      Visit Diagnoses     Postimmunization reaction, initial encounter    -  Primary    Myalgia        Nausea          1.    2.   3.    Patient gave me permission to discuss medical history, care, and plan with those present in the room.   Electronically signed by: Dilcia Coles MD, 7/20/2021  MD Dilcia Abdi MD  07/20/21 7828

## 2021-10-04 ENCOUNTER — APPOINTMENT (OUTPATIENT)
Dept: GENERAL RADIOLOGY | Age: 21
End: 2021-10-04
Payer: COMMERCIAL

## 2021-10-04 ENCOUNTER — HOSPITAL ENCOUNTER (EMERGENCY)
Age: 21
Discharge: HOME | End: 2021-10-04
Attending: EMERGENCY MEDICINE
Payer: COMMERCIAL

## 2021-10-04 VITALS
TEMPERATURE: 98.1 F | DIASTOLIC BLOOD PRESSURE: 67 MMHG | BODY MASS INDEX: 28.32 KG/M2 | OXYGEN SATURATION: 98 % | WEIGHT: 170 LBS | SYSTOLIC BLOOD PRESSURE: 120 MMHG | HEART RATE: 95 BPM | RESPIRATION RATE: 17 BRPM | HEIGHT: 65 IN

## 2021-10-04 DIAGNOSIS — R42 DIZZINESS: ICD-10-CM

## 2021-10-04 DIAGNOSIS — J40 BRONCHITIS: ICD-10-CM

## 2021-10-04 DIAGNOSIS — R53.83 OTHER FATIGUE: Primary | ICD-10-CM

## 2021-10-04 LAB
GLUCOSE BLD-MCNC: 109 MG/DL
GLUCOSE BLD-MCNC: 109 MG/DL (ref 70–99)
INTERPRETATION: NORMAL
PREGNANCY, URINE: NEGATIVE
SPECIFIC GRAVITY, URINE: 1.02 (ref 1–1.03)

## 2021-10-04 PROCEDURE — 93005 ELECTROCARDIOGRAM TRACING: CPT | Performed by: EMERGENCY MEDICINE

## 2021-10-04 PROCEDURE — 82962 GLUCOSE BLOOD TEST: CPT

## 2021-10-04 PROCEDURE — 81025 URINE PREGNANCY TEST: CPT

## 2021-10-04 PROCEDURE — 99285 EMERGENCY DEPT VISIT HI MDM: CPT

## 2021-10-04 PROCEDURE — U0005 INFEC AGEN DETEC AMPLI PROBE: HCPCS

## 2021-10-04 PROCEDURE — 71045 X-RAY EXAM CHEST 1 VIEW: CPT

## 2021-10-04 PROCEDURE — U0003 INFECTIOUS AGENT DETECTION BY NUCLEIC ACID (DNA OR RNA); SEVERE ACUTE RESPIRATORY SYNDROME CORONAVIRUS 2 (SARS-COV-2) (CORONAVIRUS DISEASE [COVID-19]), AMPLIFIED PROBE TECHNIQUE, MAKING USE OF HIGH THROUGHPUT TECHNOLOGIES AS DESCRIBED BY CMS-2020-01-R: HCPCS

## 2021-10-04 PROCEDURE — 6370000000 HC RX 637 (ALT 250 FOR IP): Performed by: EMERGENCY MEDICINE

## 2021-10-04 RX ORDER — IBUPROFEN 400 MG/1
600 TABLET ORAL ONCE
Status: COMPLETED | OUTPATIENT
Start: 2021-10-04 | End: 2021-10-04

## 2021-10-04 RX ORDER — IBUPROFEN 600 MG/1
600 TABLET ORAL 3 TIMES DAILY PRN
Qty: 30 TABLET | Refills: 0 | Status: SHIPPED | OUTPATIENT
Start: 2021-10-04 | End: 2021-12-05

## 2021-10-04 RX ORDER — ACETAMINOPHEN 325 MG/1
650 TABLET ORAL ONCE
Status: COMPLETED | OUTPATIENT
Start: 2021-10-04 | End: 2021-10-04

## 2021-10-04 RX ORDER — BENZONATATE 100 MG/1
100 CAPSULE ORAL ONCE
Status: COMPLETED | OUTPATIENT
Start: 2021-10-04 | End: 2021-10-04

## 2021-10-04 RX ORDER — BENZONATATE 100 MG/1
100 CAPSULE ORAL 3 TIMES DAILY PRN
Qty: 10 CAPSULE | Refills: 0 | Status: SHIPPED | OUTPATIENT
Start: 2021-10-04 | End: 2021-10-11

## 2021-10-04 RX ADMIN — IBUPROFEN 600 MG: 400 TABLET, FILM COATED ORAL at 21:10

## 2021-10-04 RX ADMIN — BENZONATATE 100 MG: 100 CAPSULE ORAL at 21:10

## 2021-10-04 RX ADMIN — ACETAMINOPHEN 650 MG: 325 TABLET ORAL at 21:10

## 2021-10-04 ASSESSMENT — PAIN SCALES - GENERAL: PAINLEVEL_OUTOF10: 7

## 2021-10-05 ENCOUNTER — CARE COORDINATION (OUTPATIENT)
Dept: CARE COORDINATION | Age: 21
End: 2021-10-05

## 2021-10-05 LAB — SARS-COV-2: NOT DETECTED

## 2021-10-05 NOTE — CARE COORDINATION
[facility] ED Follow Up Call    10/5/2021    Patient: Edil Schumacher Patient : 2000   MRN: <[id number]>  Reason for Admission:  Concern for COVID  Discharge Date: 10/5/21    Patient contacted regarding COVID-19 risk. Discussed COVID-19 related testing which was pending at this time. Test results were pending. Patient informed of results, if available? No.      Ambulatory Care Manager contacted the patient by telephone to perform post discharge assessment. Call within 2 business days of discharge: Yes. Verified name and  with patient as identifiers. Provided introduction to self, and explanation of the ACM role, and reason for call due to risk factors for infection and/or exposure to COVID-19. Symptoms reviewed with patient who verbalized the following symptoms: fatigue and cough. Encouraged rest/ hydration. Instructed on s/s to report to MD/Jana. Due to no new or worsening symptoms encounter was not routed to provider for escalation. Discussed follow-up appointments. If no appointment was previously scheduled, appointment scheduling offered: Yes. Instructed on the [facility]. Patient denies need for support to schedule. [facility] follow up appointment(s): No future appointments. Non-[facility] follow up appointment(s):    Non-face-to-face services provided:  Education of patient/family/caregiver/guardian to support self-management-1     Advance Care Planning:   Does patient have an Advance Directive:  not on file. Educated patient about risk for severe COVID-19 due to risk factors according to CDC guidelines. ACM reviewed discharge instructions, medical action plan and red flag symptoms with the patient who verbalized understanding. Discussed COVID vaccination status: Yes. Education provided on COVID-19 vaccination as appropriate. Discussed exposure protocols and quarantine with CDC Guidelines.  Patient was given an opportunity to verbalize any questions and concerns and agrees to contact ACM or health care provider for questions related to their healthcare. Reviewed and educated patient on any new and changed medications related to discharge diagnosis. Patient verbalized plan to obtain prescriptions. Denies any questions or barriers. Was patient discharged with a pulse oximeter? No.  Discussed and confirmed pulse oximeter discharge instructions and when to notify provider or seek emergency care. ACM provided contact information. No further follow-up call identified based on severity of symptoms and risk factors.        Care Transitions ED Follow Up    Care Transitions Interventions

## 2021-10-05 NOTE — ED PROVIDER NOTES
CHIEF COMPLAINT  Chief Complaint   Patient presents with    Chills    Dizziness    Cough       KATELYN Santoro is a 23 y.o. female with history of asthma who presents with feeling unwell over the past day with lightheadedness, myalgias, fatigue and cough. She has had some mild cough and fatigue over the past 2 to 3 days but today she was at work and felt lightheaded like she might pass out. She is not been sexually active in the past 6 months, she denies any abdominal pain, chest pain. She tested negative for Covid on a home test but believes work may require that she have an additional test so she request this at this time. She has had a nonproductive cough, denies fever, hemoptysis, leg swelling. No medications taken prior to arrival for improvement. She has been vaccinated for Covid. REVIEW OF SYSTEMS  Review of Systems   History obtained from chart review and the patient  General ROS: positive for  - fatigue  Ophthalmic ROS: negative for - decreased vision or double vision  ENT ROS: positive for - nasal congestion  Hematological and Lymphatic ROS: negative for - bleeding problems or blood clots  Endocrine ROS: negative for - unexpected weight changes  Respiratory ROS: positive for - cough  Cardiovascular ROS: negative for - palpitations  Gastrointestinal ROS: no abdominal pain, change in bowel habits, or black or bloody stools  Genito-Urinary ROS: no dysuria, trouble voiding, or hematuria  Musculoskeletal ROS: negative for - joint stiffness  Neurological ROS: no TIA or stroke symptoms      PAST MEDICAL HISTORY  Past Medical History:   Diagnosis Date    ADHD (attention deficit hyperactivity disorder)     Anxiety     Asthma     Depression        FAMILY HISTORY  No family history on file.     SOCIAL HISTORY  Social History     Socioeconomic History    Marital status: Single     Spouse name: Not on file    Number of children: Not on file    Years of education: Not on file    Highest education level: Not on file   Occupational History    Not on file   Tobacco Use    Smoking status: Current Some Day Smoker     Packs/day: 0.50     Types: Cigarettes    Smokeless tobacco: Never Used   Vaping Use    Vaping Use: Some days   Substance and Sexual Activity    Alcohol use: No    Drug use: No    Sexual activity: Not on file   Other Topics Concern    Not on file   Social History Narrative    Not on file     Social Determinants of Health     Financial Resource Strain:     Difficulty of Paying Living Expenses:    Food Insecurity:     Worried About Running Out of Food in the Last Year:     Ran Out of Food in the Last Year:    Transportation Needs:     Lack of Transportation (Medical):  Lack of Transportation (Non-Medical):    Physical Activity:     Days of Exercise per Week:     Minutes of Exercise per Session:    Stress:     Feeling of Stress :    Social Connections:     Frequency of Communication with Friends and Family:     Frequency of Social Gatherings with Friends and Family:     Attends Sikhism Services:     Active Member of Clubs or Organizations:     Attends Club or Organization Meetings:     Marital Status:    Intimate Partner Violence:     Fear of Current or Ex-Partner:     Emotionally Abused:     Physically Abused:     Sexually Abused:        SURGICAL HISTORY  No past surgical history on file. CURRENT MEDICATIONS  No current facility-administered medications on file prior to encounter.      Current Outpatient Medications on File Prior to Encounter   Medication Sig Dispense Refill    ondansetron (ZOFRAN ODT) 4 MG disintegrating tablet Take 1 tablet by mouth every 8 hours as needed for Nausea 15 tablet 0    [DISCONTINUED] naproxen (NAPROSYN) 500 MG tablet Take 1 tablet by mouth 2 times daily 60 tablet 0    [DISCONTINUED] acetaminophen (TYLENOL) 325 MG tablet Take 2 tablets by mouth every 6 hours as needed for Pain 120 tablet 3    pantoprazole (PROTONIX) 40 MG tablet Take 1 tablet by mouth every morning (before breakfast) 12 tablet 0    White Petrolatum-Mineral Oil (ARTIFICIAL TEARS) 83-15 % Place into the right eye every evening 1 Tube 2    buPROPion (WELLBUTRIN SR) 150 MG extended release tablet Take 150 mg by mouth daily      Dexmethylphenidate HCl ER (FOCALIN XR) 30 MG CP24 Take 30 mg by mouth 2 times daily       sertraline (ZOLOFT) 100 MG tablet Take 150 mg by mouth daily            ALLERGIES  No Known Allergies    PHYSICAL EXAM  VITAL SIGNS: /67   Pulse 95   Temp 98.1 °F (36.7 °C) (Oral)   Resp 17   Ht 5' 5\" (1.651 m)   Wt 170 lb (77.1 kg)   SpO2 98%   BMI 28.29 kg/m²   Constitutional: Well developed, Well nourished, resting in bed, active  HENT: Normocephalic, Atraumatic, Bilateral external ears normal, Oropharynx moist, No oral exudates, Nose normal.   Eyes: PERRL, EOMI, Conjunctiva normal, No discharge. Neck: Normal range of motion, Supple, No stridor. Cardiovascular: Normal heart rate, Normal rhythm, No murmurs, No rubs, No gallops. Thorax & Lungs: Normal breath sounds, No respiratory distress, No wheezing, No chest tenderness. Abdomen: Bowel sounds normal, Soft, No tenderness, no guarding, no rebound, No masses, No pulsatile masses. Skin: Warm, Dry, No erythema, No rash. Extremities: Intact distal pulses, No edema, No tenderness, No cyanosis, No clubbing. Musculoskeletal: Good gross range of motion in all major joints. No major deformities noted. Neurologic: Alert & oriented x 3, Normal gross motor function, Normal gross sensory function, No focal deficits noted.    Psychiatric: Affect normal    EKG  EKG Interpretation    Interpreted by emergency department physician from October 4, 1949    Rhythm: normal sinus   Rate: normal  Axis: normal  Ectopy: none  Conduction: normal  ST Segments: nonspecific changes  T Waves: no acute change  Q Waves: none    Clinical Impression: Normal sinus rhythm with a rate of 99 and a regular axis, [address] [facility]

## 2021-10-06 LAB
EKG ATRIAL RATE: 99 BPM
EKG DIAGNOSIS: NORMAL
EKG P AXIS: 31 DEGREES
EKG P-R INTERVAL: 134 MS
EKG Q-T INTERVAL: 328 MS
EKG QRS DURATION: 74 MS
EKG QTC CALCULATION (BAZETT): 420 MS
EKG R AXIS: 80 DEGREES
EKG T AXIS: 23 DEGREES
EKG VENTRICULAR RATE: 99 BPM

## 2021-10-06 PROCEDURE — 93010 ELECTROCARDIOGRAM REPORT: CPT | Performed by: INTERNAL MEDICINE

## 2021-12-05 ENCOUNTER — HOSPITAL ENCOUNTER (EMERGENCY)
Age: 21
Discharge: HOME | End: 2021-12-05
Attending: EMERGENCY MEDICINE
Payer: COMMERCIAL

## 2021-12-05 VITALS
HEIGHT: 65 IN | WEIGHT: 175 LBS | SYSTOLIC BLOOD PRESSURE: 139 MMHG | OXYGEN SATURATION: 99 % | BODY MASS INDEX: 29.16 KG/M2 | DIASTOLIC BLOOD PRESSURE: 65 MMHG | RESPIRATION RATE: 22 BRPM | TEMPERATURE: 98.6 F | HEART RATE: 83 BPM

## 2021-12-05 DIAGNOSIS — J01.90 ACUTE SINUSITIS, RECURRENCE NOT SPECIFIED, UNSPECIFIED LOCATION: Primary | ICD-10-CM

## 2021-12-05 DIAGNOSIS — J06.9 UPPER RESPIRATORY TRACT INFECTION, UNSPECIFIED TYPE: ICD-10-CM

## 2021-12-05 PROCEDURE — 6370000000 HC RX 637 (ALT 250 FOR IP): Performed by: EMERGENCY MEDICINE

## 2021-12-05 PROCEDURE — 99283 EMERGENCY DEPT VISIT LOW MDM: CPT

## 2021-12-05 RX ORDER — GUAIFENESIN AND DEXTROMETHORPHAN HYDROBROMIDE 600; 30 MG/1; MG/1
1-2 TABLET, EXTENDED RELEASE ORAL 2 TIMES DAILY PRN
Qty: 28 TABLET | Refills: 0 | Status: SHIPPED | OUTPATIENT
Start: 2021-12-05 | End: 2021-12-05 | Stop reason: SDUPTHER

## 2021-12-05 RX ORDER — BENZONATATE 200 MG/1
200 CAPSULE ORAL 3 TIMES DAILY PRN
Qty: 30 CAPSULE | Refills: 0 | Status: SHIPPED | OUTPATIENT
Start: 2021-12-05 | End: 2021-12-12

## 2021-12-05 RX ORDER — AMOXICILLIN 500 MG/1
500 CAPSULE ORAL 2 TIMES DAILY
Qty: 20 CAPSULE | Refills: 0 | Status: SHIPPED | OUTPATIENT
Start: 2021-12-05 | End: 2021-12-15

## 2021-12-05 RX ORDER — AMOXICILLIN 250 MG/1
500 CAPSULE ORAL ONCE
Status: COMPLETED | OUTPATIENT
Start: 2021-12-05 | End: 2021-12-05

## 2021-12-05 RX ORDER — AMOXICILLIN 500 MG/1
500 CAPSULE ORAL 2 TIMES DAILY
Qty: 20 CAPSULE | Refills: 0 | Status: SHIPPED | OUTPATIENT
Start: 2021-12-05 | End: 2021-12-05 | Stop reason: SDUPTHER

## 2021-12-05 RX ORDER — ALBUTEROL SULFATE 90 UG/1
2 AEROSOL, METERED RESPIRATORY (INHALATION) 4 TIMES DAILY PRN
Qty: 54 G | Refills: 1 | Status: SHIPPED | OUTPATIENT
Start: 2021-12-05

## 2021-12-05 RX ORDER — GUAIFENESIN AND DEXTROMETHORPHAN HYDROBROMIDE 600; 30 MG/1; MG/1
1-2 TABLET, EXTENDED RELEASE ORAL 2 TIMES DAILY PRN
Qty: 28 TABLET | Refills: 0 | Status: ON HOLD | OUTPATIENT
Start: 2021-12-05 | End: 2022-08-01

## 2021-12-05 RX ORDER — LORATADINE 10 MG/1
10 TABLET ORAL DAILY PRN
Qty: 20 TABLET | Refills: 0 | Status: SHIPPED | OUTPATIENT
Start: 2021-12-05

## 2021-12-05 RX ORDER — BENZONATATE 200 MG/1
200 CAPSULE ORAL 3 TIMES DAILY PRN
Qty: 30 CAPSULE | Refills: 0 | Status: SHIPPED | OUTPATIENT
Start: 2021-12-05 | End: 2021-12-05 | Stop reason: SDUPTHER

## 2021-12-05 RX ORDER — ALBUTEROL SULFATE 90 UG/1
2 AEROSOL, METERED RESPIRATORY (INHALATION) 4 TIMES DAILY PRN
Qty: 54 G | Refills: 1 | Status: SHIPPED | OUTPATIENT
Start: 2021-12-05 | End: 2021-12-05 | Stop reason: SDUPTHER

## 2021-12-05 RX ORDER — LORATADINE 10 MG/1
10 TABLET ORAL DAILY PRN
Qty: 20 TABLET | Refills: 0 | Status: SHIPPED | OUTPATIENT
Start: 2021-12-05 | End: 2021-12-05 | Stop reason: SDUPTHER

## 2021-12-05 RX ADMIN — AMOXICILLIN 500 MG: 250 CAPSULE ORAL at 20:36

## 2021-12-05 ASSESSMENT — PAIN DESCRIPTION - LOCATION: LOCATION: CHEST

## 2021-12-05 ASSESSMENT — PAIN SCALES - GENERAL: PAINLEVEL_OUTOF10: 8

## 2021-12-05 ASSESSMENT — PAIN DESCRIPTION - PAIN TYPE: TYPE: ACUTE PAIN

## 2021-12-05 NOTE — Clinical Note
Derrick Rodriguez was seen and treated in our emergency department on 12/5/2021. She may return to work on 12/09/2021. If you have any questions or concerns, please don't hesitate to call.       Odalys Miramontes MD

## 2021-12-06 NOTE — ED PROVIDER NOTES
Emergency Department Encounter    Patient: Pretty Elliott  MRN: [id number]  : 2000  Date of Evaluation: 2021  ED Provider:  Melissa Carmichael MD      Triage Chief Complaint:   URI, Cough, and Nausea      [place]:  Pretty Elliott is a 19 y.o. female that presents to the emergency department with a constellation of symptoms beginning about 2 or 3 days ago. She reports a first onset of \"burning\" in her sinuses. This discomfort has been generally constant in timing. She has noticed some thick, green drainage at times. She has had pressure and discomfort in both ears. There is painful swallowing, though there is no difficulty. She has felt like she is losing her voice at times. She reports temperatures reaching a maximum of 101. She denies any diarrhea or change in taste or smell sensations. There is possible COVID-19 exposure from a niece several days ago. Patient reports she had the first shot of a COVID-19 vaccination but had an allergic reaction, so she never had the second dosage. She denies any neck stiffness. She denies any chest or abdominal pain. .  Patient reports no other particular provocative or alleviating factors. ROS - see HPI, below listed is current ROS at time of my eval:  CONSTITUTIONAL: As above. EYES: No vision change, redness, drainage, or discharge. HENT: As above. RESPIRATORY: No shortness of breath. CARDIOVASCULAR: No chest pain. GASTROINTESTINAL: No nausea, vomiting, or abdominal pain. GENITOURINARY: No frequency, urgency, or dysuria. No hematuria. MUSCULOSKELETAL: No recent injury. No neck, back, or extremity pain. NEUROLOGICAL: No focal weakness, numbness, or tingling. SKIN: No rashes or other lesions reported. No yellowing of the skin. Past Medical History:   Diagnosis Date    ADHD (attention deficit hyperactivity disorder)     Anxiety     Asthma     Depression      History reviewed. No pertinent surgical history. History reviewed.  No pertinent family history. Social History     Socioeconomic History    Marital status: Single     Spouse name: Not on file    Number of children: Not on file    Years of education: Not on file    Highest education level: Not on file   Occupational History    Not on file   Tobacco Use    Smoking status: Current Some Day Smoker     Packs/day: 0.50     Types: Cigarettes    Smokeless tobacco: Never Used   Vaping Use    Vaping Use: Some days   Substance and Sexual Activity    Alcohol use: No    Drug use: No    Sexual activity: Not on file   Other Topics Concern    Not on file   Social History Narrative    Not on file     Social Determinants of Health     Financial Resource Strain:     Difficulty of Paying Living Expenses: Not on file   Food Insecurity:     Worried About Running Out of Food in the Last Year: Not on file    Herminia of Food in the Last Year: Not on file   Transportation Needs:     Lack of Transportation (Medical): Not on file    Lack of Transportation (Non-Medical):  Not on file   Physical Activity:     Days of Exercise per Week: Not on file    Minutes of Exercise per Session: Not on file   Stress:     Feeling of Stress : Not on file   Social Connections:     Frequency of Communication with Friends and Family: Not on file    Frequency of Social Gatherings with Friends and Family: Not on file    Attends Synagogue Services: Not on file    Active Member of [address] [place] [address] [employer] or Organizations: Not on file    Attends Club or Organization Meetings: Not on file    Marital Status: Not on file   Intimate Partner Violence:     Fear of Current or Ex-Partner: Not on file    Emotionally Abused: Not on file    Physically Abused: Not on file    Sexually Abused: Not on file   Housing Stability:     Unable to Pay for Housing in the Last Year: Not on file    Number of [place] in the Last Year: Not on file    Unstable Housing in the Last Year: Not on file     No current facility-administered medications for this encounter. Current Outpatient Medications   Medication Sig Dispense Refill    albuterol sulfate HFA (VENTOLIN HFA) 108 (90 Base) MCG/ACT inhaler Inhale 2 puffs into the lungs 4 times daily as needed for Wheezing With spacer. 54 g 1    amoxicillin (AMOXIL) 500 MG capsule Take 1 capsule by mouth 2 times daily for 10 days 20 capsule 0    benzonatate (TESSALON) 200 MG capsule Take 1 capsule by mouth 3 times daily as needed for Cough 30 capsule 0    Dextromethorphan-guaiFENesin (MUCINEX DM)  MG TB12 Take 1-2 tablets by mouth 2 times daily as needed (Cough, congestion, sinus pressure) 28 tablet 0    loratadine (CLARITIN) 10 MG tablet Take 1 tablet by mouth daily as needed (Congestion, sinus pressure) 20 tablet 0     No Known Allergies    Nursing Notes Reviewed    Physical Exam:  Triage VS:    ED Triage Vitals [12/05/21 1952]   Enc Vitals Group      /65      Pulse 83      Resp 22      Temp 98.6 °F (37 °C)      Temp Source Oral      SpO2 99 %      Weight 175 lb (79.4 kg)      Height 5' 5\" (1.651 m)      Head Circumference       Peak Flow       Pain Score       Pain Loc       Pain Edu? Excl. in [address]? My pulse ox interpretation is -normal on room air    GENERAL: Patient is awake, alert, and oriented appropriately. Patient is resting comfortably in a still position on the exam table. Patient speaking in full and complete sentences. Well-nourished and well-developed. HEENT: Normocephalic and atraumatic. Pupils equal, round, and reactive to light. No redness or matting. Bilateral external ears are unremarkable. Tympanic membranes with mild to moderate but symmetric TM erythema. No visible effusions. Generalized tenderness of the frontal and maxillary sinuses are noted. Nasal mucosa is pink without purulence. Oral mucosa is moist and pink. There is no significant tonsillar enlargement or exudate. Uvula midline.   There is no elevation of the tongue or pooling of secretions. NECK: Supple with normal range of motion. No significant lymphadenopathy. No Kernig's or Brudzinski sign. RESPIRATORY: Normal respirations. No wheeze or stridor. No rales or rhonchi. CARDIOVASCULAR: Regular rate and rhythm. No central or peripheral cyanosis. GASTROINTESTINAL: Soft, nontender, and nondistended. No focal tenderness or rigidity. NEUROLOGICAL: Awake, alert and oriented x 3. Cranial nerves III through XII are grossly intact as tested without facial droop or dermatomal paresthesias. Of note, forehead wrinkles are symmetric and intact. Conjugate gaze without entrapment. No asymmetry of the corners of the mouth or nasolabial folds. No gross motor or cerebellar deficits. BACK/MUSCULOSKELETAL: No asymmetric edema, Riesa Darek' sign, or cords. SKIN: Normal tone for ethnicity. Normal turgor and brisk capillary refill peripherally. No petechiae, purpura, vesicles, bullae, or other lesions. No icterus. Emergency department course. Patient is brought to bed 6 and assessed and reassessed by me. After initial evaluation, plan and medical decision making are discussed with the patient. She does have fairly significant and reproducible tenderness of the frontal and maxillary sinuses. With the reported purulent discharge, empiric treatment with amoxicillin for an acute sinusitis could offer benefit. We have discussed that sinusitis is frequently a viral infection, so continuing symptomatic management remains very important. Consider COVID-19, but this facility is critically low on testing supplies, so we have discussed that the available resources are generally being triaged to those who are likely to need admission to the hospital.  She will be provided resources for outpatient testing if still desired. At this time, she is saturating appropriately on room air. There is no distress or cyanosis.   Medical screening examination suggests sufficiently low risk for pneumonia or other septic process, acute coronary syndrome, venous thromboembolic disease, or surgical abdominal emergency. I do not believe that further laboratory testing or imaging is indicated. Patient is agreeable to continuing plan. We have discussed all available results. Patient is satisfied with evaluation and agreeable to recommendations. Patient has had the opportunity to ask questions, and they have been answered to the best of my ability. Instructions are given to follow-up with primary care provider for reevaluation and further testing. Very strict return and follow-up instructions are provided. Patient seen during [facility], I did don appropriate PPE during my encounters with the patient, including n95 (when appropriate) mask and eye protection as appropriate. I have reviewed and interpreted all of the currently available lab results from this visit (if applicable):  None indicated. Radiographs (if obtained):  Radiologist's Report Reviewed:  None indicated. .    Medical decision making:  As discussed. Patient presents to the emergency department with signs and symptoms consistent with an upper respiratory infection, likely viral in etiology. Certainly [id number], but we will defer specific testing to the provided outpatient resources. She is saturating appropriately on room air without distress or cyanosis. Medical screening examination does not suggest pneumonia or other septic process. I doubt meningitis, encephalitis, cerebritis, or subarachnoid hemorrhage. There have been no meningeal findings. Abdomen has been soft and nontender. There has been no respiratory distress, hypoxia, or cyanosis. There has been no lethargy or irritability. Patient appears generally well hydrated and nontoxic. We have discussed trial of outpatient management including conscientious hydration and careful follow-up with primary care provider. Procedures: None.     Consultations: None.    Clinical Impression:  1. Acute sinusitis, recurrence not specified, unspecified location    2. Upper respiratory tract infection, unspecified type      Disposition referral (if applicable):  Nubia Ochoa MD  [address] [place] [address], [address] [place] [address]  [phone]    Schedule an appointment as soon as possible for a visit   Or your regular primary care provider for follow-up    [address]  [phone]    As needed, If symptoms worsen    Disposition medications (if applicable):  Discharge Medication List as of 12/5/2021  8:33 PM      START taking these medications    Details   amoxicillin (AMOXIL) 500 MG capsule Take 1 capsule by mouth 2 times daily for 10 days, Disp-20 capsule, R-0Normal      Dextromethorphan-guaiFENesin (MUCINEX DM)  MG TB12 Take 1-2 tablets by mouth 2 times daily as needed (Cough, congestion, sinus pressure), Disp-28 tablet, R-0Normal      albuterol sulfate HFA (VENTOLIN HFA) 108 (90 Base) MCG/ACT inhaler Inhale 2 puffs into the lungs 4 times daily as needed for Wheezing With spacer., Disp-54 g, R-1Normal      benzonatate (TESSALON) 200 MG capsule Take 1 capsule by mouth 3 times daily as needed for Cough, Disp-30 capsule, R-0Normal      loratadine (CLARITIN) 10 MG tablet Take 1 tablet by mouth daily as needed (Congestion, sinus pressure), Disp-20 tablet, R-0Normal           ED Provider Disposition Time  DISPOSITION Decision To Discharge 12/05/2021 08:14:32 PM      Comment: Please note this report has been produced using speech recognition software and may contain errors related to that system including errors in grammar, punctuation, and spelling, as well as words and phrases that may be inappropriate. Efforts were made to edit the dictations.         Jose D Mejia MD  12/06/21 0197

## 2022-01-02 ENCOUNTER — APPOINTMENT (OUTPATIENT)
Dept: CT IMAGING | Age: 22
End: 2022-01-02
Payer: COMMERCIAL

## 2022-01-02 ENCOUNTER — APPOINTMENT (OUTPATIENT)
Dept: GENERAL RADIOLOGY | Age: 22
End: 2022-01-02
Payer: COMMERCIAL

## 2022-01-02 ENCOUNTER — HOSPITAL ENCOUNTER (EMERGENCY)
Age: 22
Discharge: HOME | End: 2022-01-02
Attending: EMERGENCY MEDICINE
Payer: COMMERCIAL

## 2022-01-02 VITALS
TEMPERATURE: 98.2 F | BODY MASS INDEX: 29.16 KG/M2 | WEIGHT: 175 LBS | OXYGEN SATURATION: 99 % | HEART RATE: 94 BPM | DIASTOLIC BLOOD PRESSURE: 74 MMHG | RESPIRATION RATE: 16 BRPM | SYSTOLIC BLOOD PRESSURE: 129 MMHG | HEIGHT: 65 IN

## 2022-01-02 DIAGNOSIS — R07.9 CHEST PAIN, UNSPECIFIED TYPE: ICD-10-CM

## 2022-01-02 DIAGNOSIS — M54.9 BACK PAIN, UNSPECIFIED BACK LOCATION, UNSPECIFIED BACK PAIN LATERALITY, UNSPECIFIED CHRONICITY: ICD-10-CM

## 2022-01-02 DIAGNOSIS — R10.9 ABDOMINAL PAIN, UNSPECIFIED ABDOMINAL LOCATION: ICD-10-CM
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
## 2022-01-02 DIAGNOSIS — R51.9 NONINTRACTABLE HEADACHE, UNSPECIFIED CHRONICITY PATTERN, UNSPECIFIED HEADACHE TYPE: ICD-10-CM

## 2022-01-02 DIAGNOSIS — S01.01XA LACERATION OF SCALP, INITIAL ENCOUNTER: ICD-10-CM

## 2022-01-02 LAB
ALBUMIN SERPL-MCNC: 4.3 GM/DL (ref 3.4–5)
ALCOHOL SCREEN SERUM: <0.01 %WT/VOL
ALP BLD-CCNC: 67 IU/L (ref 40–128)
ALT SERPL-CCNC: 10 U/L (ref 10–40)
ANION GAP SERPL CALCULATED.3IONS-SCNC: 10 MMOL/L (ref 4–16)
AST SERPL-CCNC: 12 IU/L (ref 15–37)
BACTERIA: ABNORMAL /HPF
BASOPHILS ABSOLUTE: 0 K/CU MM
BASOPHILS RELATIVE PERCENT: 0.4 % (ref 0–1)
BILIRUB SERPL-MCNC: 0.2 MG/DL (ref 0–1)
BILIRUBIN URINE: NEGATIVE MG/DL
BLOOD, URINE: NEGATIVE
BUN BLDV-MCNC: 10 MG/DL (ref 6–23)
CALCIUM SERPL-MCNC: 9.1 MG/DL (ref 8.3–10.6)
CHLORIDE BLD-SCNC: 105 MMOL/L (ref 99–110)
CLARITY: CLEAR
CO2: 22 MMOL/L (ref 21–32)
COLOR: ABNORMAL
CREAT SERPL-MCNC: 1.1 MG/DL (ref 0.6–1.1)
DIFFERENTIAL TYPE: ABNORMAL
EOSINOPHILS ABSOLUTE: 0.1 K/CU MM
EOSINOPHILS RELATIVE PERCENT: 0.5 % (ref 0–3)
GFR AFRICAN AMERICAN: >60 ML/MIN/1.73M2
GFR NON-AFRICAN AMERICAN: >60 ML/MIN/1.73M2
GLUCOSE BLD-MCNC: 94 MG/DL (ref 70–99)
GLUCOSE, URINE: NEGATIVE MG/DL
GONADOTROPIN, CHORIONIC (HCG) QUANT: 0.5 UIU/ML
HCT VFR BLD CALC: 38.8 % (ref 37–47)
HEMOGLOBIN: 13.1 GM/DL (ref 12.5–16)
IMMATURE NEUTROPHIL %: 0.2 % (ref 0–0.43)
KETONES, URINE: NEGATIVE MG/DL
LACTATE: 1.3 MMOL/L (ref 0.4–2)
LEUKOCYTE ESTERASE, URINE: ABNORMAL
LIPASE: 30 IU/L (ref 13–60)
LYMPHOCYTES ABSOLUTE: 2.5 K/CU MM
LYMPHOCYTES RELATIVE PERCENT: 24.7 % (ref 24–44)
MCH RBC QN AUTO: 31.5 PG (ref 27–31)
MCHC RBC AUTO-ENTMCNC: 33.8 % (ref 32–36)
MCV RBC AUTO: 93.3 FL (ref 78–100)
MONOCYTES ABSOLUTE: 0.7 K/CU MM
MONOCYTES RELATIVE PERCENT: 7 % (ref 0–4)
NITRITE URINE, QUANTITATIVE: NEGATIVE
NUCLEATED RBC %: 0 %
PDW BLD-RTO: 12.7 % (ref 11.7–14.9)
PH, URINE: 6 (ref 5–8)
PLATELET # BLD: 225 K/CU MM (ref 140–440)
PMV BLD AUTO: 10.9 FL (ref 7.5–11.1)
POTASSIUM SERPL-SCNC: 4 MMOL/L (ref 3.5–5.1)
PROTEIN UA: NEGATIVE MG/DL
RBC # BLD: 4.16 M/CU MM (ref 4.2–5.4)
RBC URINE: 1 /HPF (ref 0–6)
SEGMENTED NEUTROPHILS ABSOLUTE COUNT: 6.8 K/CU MM
SEGMENTED NEUTROPHILS RELATIVE PERCENT: 67.2 % (ref 36–66)
SODIUM BLD-SCNC: 137 MMOL/L (ref 135–145)
SPECIFIC GRAVITY UA: 1.03 (ref 1–1.03)
SQUAMOUS EPITHELIAL: 1 /HPF
TOTAL CK: 54 IU/L (ref 26–140)
TOTAL IMMATURE NEUTOROPHIL: 0.02 K/CU MM
TOTAL NUCLEATED RBC: 0 K/CU MM
TOTAL PROTEIN: 6.9 GM/DL (ref 6.4–8.2)
TRICHOMONAS: ABNORMAL /HPF
UROBILINOGEN, URINE: NEGATIVE MG/DL (ref 0.2–1)
WBC # BLD: 10.1 K/CU MM (ref 4–10.5)
WBC UA: 7 /HPF (ref 0–5)

## 2022-01-02 PROCEDURE — 90471 IMMUNIZATION ADMIN: CPT | Performed by: EMERGENCY MEDICINE

## 2022-01-02 PROCEDURE — 82550 ASSAY OF CK (CPK): CPT

## 2022-01-02 PROCEDURE — 12001 RPR S/N/AX/GEN/TRNK 2.5CM/<: CPT

## 2022-01-02 PROCEDURE — 76376 3D RENDER W/INTRP POSTPROCES: CPT

## 2022-01-02 PROCEDURE — 96375 TX/PRO/DX INJ NEW DRUG ADDON: CPT

## 2022-01-02 PROCEDURE — 85025 COMPLETE CBC W/AUTO DIFF WBC: CPT

## 2022-01-02 PROCEDURE — 6360000004 HC RX CONTRAST MEDICATION: Performed by: EMERGENCY MEDICINE

## 2022-01-02 PROCEDURE — 83605 ASSAY OF LACTIC ACID: CPT

## 2022-01-02 PROCEDURE — 72170 X-RAY EXAM OF PELVIS: CPT

## 2022-01-02 PROCEDURE — 96376 TX/PRO/DX INJ SAME DRUG ADON: CPT

## 2022-01-02 PROCEDURE — 71275 CT ANGIOGRAPHY CHEST: CPT

## 2022-01-02 PROCEDURE — 70486 CT MAXILLOFACIAL W/O DYE: CPT

## 2022-01-02 PROCEDURE — 84702 CHORIONIC GONADOTROPIN TEST: CPT

## 2022-01-02 PROCEDURE — 70450 CT HEAD/BRAIN W/O DYE: CPT

## 2022-01-02 PROCEDURE — 71045 X-RAY EXAM CHEST 1 VIEW: CPT

## 2022-01-02 PROCEDURE — 80053 COMPREHEN METABOLIC PANEL: CPT

## 2022-01-02 PROCEDURE — 72125 CT NECK SPINE W/O DYE: CPT

## 2022-01-02 PROCEDURE — 90715 TDAP VACCINE 7 YRS/> IM: CPT | Performed by: EMERGENCY MEDICINE

## 2022-01-02 PROCEDURE — 81001 URINALYSIS AUTO W/SCOPE: CPT

## 2022-01-02 PROCEDURE — 6360000002 HC RX W HCPCS: Performed by: EMERGENCY MEDICINE

## 2022-01-02 PROCEDURE — 96374 THER/PROPH/DIAG INJ IV PUSH: CPT

## 2022-01-02 PROCEDURE — 83690 ASSAY OF LIPASE: CPT

## 2022-01-02 PROCEDURE — 99284 EMERGENCY DEPT VISIT MOD MDM: CPT

## 2022-01-02 PROCEDURE — G0480 DRUG TEST DEF 1-7 CLASSES: HCPCS

## 2022-01-02 RX ORDER — CYCLOBENZAPRINE HCL 10 MG
10 TABLET ORAL NIGHTLY PRN
Qty: 10 TABLET | Refills: 0 | Status: SHIPPED | OUTPATIENT
Start: 2022-01-02 | End: 2022-01-12

## 2022-01-02 RX ORDER — MORPHINE SULFATE 2 MG/ML
4 INJECTION, SOLUTION INTRAMUSCULAR; INTRAVENOUS EVERY 30 MIN PRN
Status: DISCONTINUED | OUTPATIENT
Start: 2022-01-02 | End: 2022-01-02 | Stop reason: HOSPADM

## 2022-01-02 RX ORDER — ONDANSETRON 2 MG/ML
4 INJECTION INTRAMUSCULAR; INTRAVENOUS EVERY 30 MIN PRN
Status: DISCONTINUED | OUTPATIENT
Start: 2022-01-02 | End: 2022-01-02 | Stop reason: HOSPADM

## 2022-01-02 RX ORDER — SODIUM CHLORIDE 0.9 % (FLUSH) 0.9 %
5-40 SYRINGE (ML) INJECTION 2 TIMES DAILY
Status: DISCONTINUED | OUTPATIENT
Start: 2022-01-02 | End: 2022-01-02 | Stop reason: HOSPADM

## 2022-01-02 RX ORDER — LIDOCAINE 50 MG/G
1 PATCH TOPICAL DAILY
Qty: 30 PATCH | Refills: 0 | Status: ON HOLD | OUTPATIENT
Start: 2022-01-02 | End: 2022-08-01

## 2022-01-02 RX ORDER — PRAMOXINE HCL/BENZALKONIUM CHL 1%-0.13%
SPRAY, NON-AEROSOL (ML) TOPICAL
Qty: 1 G | Refills: 0 | Status: SHIPPED | OUTPATIENT
Start: 2022-01-02 | End: 2022-01-09

## 2022-01-02 RX ORDER — NAPROXEN 500 MG/1
500 TABLET ORAL 2 TIMES DAILY
Qty: 60 TABLET | Refills: 0 | Status: ON HOLD | OUTPATIENT
Start: 2022-01-02 | End: 2022-08-01

## 2022-01-02 RX ORDER — ACETAMINOPHEN 325 MG/1
650 TABLET ORAL EVERY 6 HOURS PRN
Qty: 120 TABLET | Refills: 3 | Status: SHIPPED | OUTPATIENT
Start: 2022-01-02

## 2022-01-02 RX ORDER — ONDANSETRON 4 MG/1
4 TABLET, ORALLY DISINTEGRATING ORAL EVERY 8 HOURS PRN
Qty: 15 TABLET | Refills: 0 | Status: SHIPPED | OUTPATIENT
Start: 2022-01-02

## 2022-01-02 RX ADMIN — ONDANSETRON 4 MG: 2 INJECTION INTRAMUSCULAR; INTRAVENOUS at 04:50

## 2022-01-02 RX ADMIN — ONDANSETRON 4 MG: 2 INJECTION INTRAMUSCULAR; INTRAVENOUS at 03:06

## 2022-01-02 RX ADMIN — MORPHINE SULFATE 4 MG: 2 INJECTION, SOLUTION INTRAMUSCULAR; INTRAVENOUS at 04:51

## 2022-01-02 RX ADMIN — MORPHINE SULFATE 4 MG: 2 INJECTION, SOLUTION INTRAMUSCULAR; INTRAVENOUS at 03:08

## 2022-01-02 RX ADMIN — TETANUS TOXOID, REDUCED DIPHTHERIA TOXOID AND ACELLULAR PERTUSSIS VACCINE, ADSORBED 0.5 ML: 5; 2.5; 8; 8; 2.5 SUSPENSION INTRAMUSCULAR at 05:14

## 2022-01-02 RX ADMIN — IOPAMIDOL 75 ML: 755 INJECTION, SOLUTION INTRAVENOUS at 03:47

## 2022-01-02 ASSESSMENT — PAIN SCALES - GENERAL
PAINLEVEL_OUTOF10: 8
PAINLEVEL_OUTOF10: 7

## 2022-01-02 ASSESSMENT — ENCOUNTER SYMPTOMS
SHORTNESS OF BREATH: 1
ABDOMINAL PAIN: 1
EYES NEGATIVE: 1
ALLERGIC/IMMUNOLOGIC NEGATIVE: 1
BACK PAIN: 1

## 2022-01-02 NOTE — ED NOTES
Bed: 01TR-01  Expected date:   Expected time:   Means of arrival:   Comments:  SH[employer]HA [employer]  01/02/22 0223
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
## 2022-01-11 ENCOUNTER — HOSPITAL ENCOUNTER (EMERGENCY)
Age: 22
Discharge: HOME | End: 2022-01-11
Attending: EMERGENCY MEDICINE
Payer: COMMERCIAL

## 2022-01-11 VITALS
TEMPERATURE: 97.3 F | DIASTOLIC BLOOD PRESSURE: 50 MMHG | SYSTOLIC BLOOD PRESSURE: 111 MMHG | RESPIRATION RATE: 14 BRPM | OXYGEN SATURATION: 96 % | WEIGHT: 175 LBS | HEART RATE: 78 BPM | BODY MASS INDEX: 29.16 KG/M2 | HEIGHT: 65 IN

## 2022-01-11 DIAGNOSIS — S39.012A STRAIN OF LUMBAR REGION, INITIAL ENCOUNTER: ICD-10-CM

## 2022-01-11 DIAGNOSIS — S46.911A STRAIN OF RIGHT SHOULDER, INITIAL ENCOUNTER: Primary | ICD-10-CM

## 2022-01-11 PROCEDURE — 99283 EMERGENCY DEPT VISIT LOW MDM: CPT

## 2022-01-11 PROCEDURE — 6370000000 HC RX 637 (ALT 250 FOR IP): Performed by: EMERGENCY MEDICINE

## 2022-01-11 RX ORDER — LIDOCAINE 4 G/G
1 PATCH TOPICAL ONCE
Status: DISCONTINUED | OUTPATIENT
Start: 2022-01-11 | End: 2022-01-11 | Stop reason: HOSPADM

## 2022-01-11 RX ORDER — LIDOCAINE 4 G/G
1 PATCH TOPICAL DAILY
Qty: 6 PATCH | Refills: 0 | Status: SHIPPED | OUTPATIENT
Start: 2022-01-11 | End: 2022-01-17

## 2022-01-11 RX ORDER — METHOCARBAMOL 500 MG/1
500 TABLET, FILM COATED ORAL ONCE
Status: COMPLETED | OUTPATIENT
Start: 2022-01-11 | End: 2022-01-11

## 2022-01-11 RX ORDER — METHOCARBAMOL 500 MG/1
500 TABLET, FILM COATED ORAL 3 TIMES DAILY PRN
Qty: 21 TABLET | Refills: 0 | Status: SHIPPED | OUTPATIENT
Start: 2022-01-11 | End: 2022-01-18

## 2022-01-11 RX ADMIN — METHOCARBAMOL TABLETS 500 MG: 500 TABLET, COATED ORAL at 19:10

## 2022-01-11 ASSESSMENT — PAIN DESCRIPTION - LOCATION: LOCATION: BACK;SHOULDER

## 2022-01-11 ASSESSMENT — PAIN SCALES - GENERAL: PAINLEVEL_OUTOF10: 7

## 2022-01-11 ASSESSMENT — PAIN DESCRIPTION - ORIENTATION: ORIENTATION: LEFT;RIGHT

## 2022-01-11 NOTE — Clinical Note
Mookie Mae was seen and treated in our emergency department on 1/11/2022. She may return to work on 01/12/2022. Light duty for 2 weeks     If you have any questions or concerns, please don't hesitate to call.       Ander Bains MD
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
## 2022-05-16 ENCOUNTER — APPOINTMENT (OUTPATIENT)
Dept: GENERAL RADIOLOGY | Age: 22
End: 2022-05-16
Payer: COMMERCIAL

## 2022-05-16 ENCOUNTER — HOSPITAL ENCOUNTER (EMERGENCY)
Age: 22
Discharge: HOME | End: 2022-05-16
Payer: COMMERCIAL

## 2022-05-16 VITALS
DIASTOLIC BLOOD PRESSURE: 67 MMHG | RESPIRATION RATE: 16 BRPM | TEMPERATURE: 98.2 F | SYSTOLIC BLOOD PRESSURE: 128 MMHG | OXYGEN SATURATION: 99 % | HEART RATE: 70 BPM

## 2022-05-16 DIAGNOSIS — M79.641 PAIN OF RIGHT HAND: ICD-10-CM

## 2022-05-16 DIAGNOSIS — M25.531 RIGHT WRIST PAIN: Primary | ICD-10-CM

## 2022-05-16 PROCEDURE — 73130 X-RAY EXAM OF HAND: CPT

## 2022-05-16 PROCEDURE — 99283 EMERGENCY DEPT VISIT LOW MDM: CPT

## 2022-05-16 PROCEDURE — 73110 X-RAY EXAM OF WRIST: CPT

## 2022-05-16 PROCEDURE — 6370000000 HC RX 637 (ALT 250 FOR IP): Performed by: PHYSICIAN ASSISTANT

## 2022-05-16 RX ORDER — ACETAMINOPHEN 500 MG
1000 TABLET ORAL ONCE
Status: COMPLETED | OUTPATIENT
Start: 2022-05-16 | End: 2022-05-16

## 2022-05-16 RX ADMIN — ACETAMINOPHEN 1000 MG: 500 TABLET ORAL at 21:30

## 2022-05-16 ASSESSMENT — PAIN SCALES - GENERAL: PAINLEVEL_OUTOF10: 7

## 2022-05-16 ASSESSMENT — PAIN - FUNCTIONAL ASSESSMENT: PAIN_FUNCTIONAL_ASSESSMENT: NONE - DENIES PAIN

## 2022-05-16 NOTE — LETTER
[facility] Emergency Department  [address] [facility], [place] [address]  Phone: [phone]  Fax: [phone]               May 16, 2022    Patient: Tricia Rodriguez   YOB: 2000   Date of Visit: 5/16/2022       To Whom It May Concern:    Angeles Anderson was seen and treated in our emergency department on 5/16/2022. She may return to work on 05/18/2022 with the restrictions of light duty and not lifting heavy items with her right arm until seen by orthopedic surgeon.        Sincerely,       Ilir Roland RN         Signature:__________________________________

## 2022-05-17 NOTE — ED PROVIDER NOTES
EMERGENCY DEPARTMENT ENCOUNTER      PCP: No primary care provider on file. CHIEF COMPLAINT    Chief Complaint   Patient presents with    Wrist Pain     right       This patient was not evaluated by the attending physician. I have independently evaluated this patient. HPI    Yady Malcolm is a 19 y.o. female who presents with Right hand and wrist pain. Onset was last night. The context is patient states she was doing repetitive movement at work last night and frequently hitting machine to do her job. Patient states she is had right hand and wrist pain since this time. Patient states he has tingling into fingers. The pain is localized to distal radial aspect of wrist and thenar aspect of hand. The pain severity is moderate. The patient has no associated other injuries. The pain is aggravated by wrist movement and direct palpation. No other injuries. Patient denies pregnancy    REVIEW OF SYSTEMS    General: Denies fever or chills  Cardiac: Denies chest pain or chestwall pain. Pulmonary: Denies shortness of breath  GI: Denies abdominal pain, vomiting, or diarrhea  : No dysuria or hematuria    Denies any other muscles skeletal injuries, including elbow, shoulder,chest wall and back. All other review of systems are negative  See HPI and nursing notes for additional information     PAST MEDICAL & SURGICAL HISTORY    Past Medical History:   Diagnosis Date    ADHD (attention deficit hyperactivity disorder)     Anxiety     Asthma     Depression      History reviewed. No pertinent surgical history.     CURRENT MEDICATIONS    Current Outpatient Rx   Medication Sig Dispense Refill    naproxen (NAPROSYN) 500 MG tablet Take 1 tablet by mouth 2 times daily 60 tablet 0    acetaminophen (TYLENOL) 325 MG tablet Take 2 tablets by mouth every 6 hours as needed for Pain 120 tablet 3    ondansetron (ZOFRAN ODT) 4 MG disintegrating tablet Take 1 tablet by mouth every 8 hours as needed for Nausea 15 tablet 0  lidocaine (LIDODERM) 5 % Place 1 patch onto the skin daily 12 hours on, 12 hours off. 30 patch 0    albuterol sulfate HFA (VENTOLIN HFA) 108 (90 Base) MCG/ACT inhaler Inhale 2 puffs into the lungs 4 times daily as needed for Wheezing With spacer. 54 g 1    Dextromethorphan-guaiFENesin (MUCINEX DM)  MG TB12 Take 1-2 tablets by mouth 2 times daily as needed (Cough, congestion, sinus pressure) 28 tablet 0    loratadine (CLARITIN) 10 MG tablet Take 1 tablet by mouth daily as needed (Congestion, sinus pressure) 20 tablet 0       ALLERGIES    Allergies   Allergen Reactions    Bee Venom Anaphylaxis       SOCIAL & FAMILY HISTORY    Social History     Socioeconomic History    Marital status: Single     Spouse name: None    Number of children: None    Years of education: None    Highest education level: None   Occupational History    None   Tobacco Use    Smoking status: Current Some Day Smoker     Packs/day: 0.50     Types: Cigarettes    Smokeless tobacco: Never Used   Vaping Use    Vaping Use: Some days   Substance and Sexual Activity    Alcohol use: Yes     Comment: occ    Drug use: No    Sexual activity: None   Other Topics Concern    None   Social History Narrative    None     Social Determinants of Health     Financial Resource Strain:     Difficulty of Paying Living Expenses: Not on file   Food Insecurity:     Worried About Running Out of Food in the Last Year: Not on file    Herminia of Food in the Last Year: Not on file   Transportation Needs:     Lack of Transportation (Medical): Not on file    Lack of Transportation (Non-Medical):  Not on file   Physical Activity:     Days of Exercise per Week: Not on file    Minutes of Exercise per Session: Not on file   Stress:     Feeling of Stress : Not on file   Social Connections:     Frequency of Communication with Friends and Family: Not on file    Frequency of Social Gatherings with Friends and Family: Not on file    Attends Restoration Services: Not on file    Active Member of Clubs or Organizations: Not on file    Attends Club or Organization Meetings: Not on file    Marital Status: Not on file   Intimate Partner Violence:     Fear of Current or Ex-Partner: Not on file    Emotionally Abused: Not on file    Physically Abused: Not on file    Sexually Abused: Not on file   Housing Stability:     Unable to Pay for Housing in the Last Year: Not on file    Number of [place] in the Last Year: Not on file    Unstable Housing in the Last Year: Not on file     History reviewed. No pertinent family history. PHYSICAL EXAM    VITAL SIGNS: /67   Pulse 70   Resp 16   SpO2 99%   Constitutional:  Well developed, well nourished, no acute distress, non-toxic appearance   HENT:  Atraumatic    Musculoskeletal:    Right hand and Wrist:  There is no overlying erythema, ecchymosis or swelling to right hand and wrist.  Mild tenderness along right thenar aspect and right distal radial aspect. Positive snuffbox tenderness. Range of motion limited due to pain. Distal sensation and capillary refill intact. Elbow, including radial head is non-tender. No swelling, discoloration, or tenderness to palpation of the ipsilateral elbow    Distal motor, sensation, capillary refill intact. Integument:  Well hydrated, no rash. skin intact  Vascular: affected extremity distally neurovascularly intact - sensation and capillary refill intact. Neurologic:  Awake alert, normal flow ofspeech   Psychiatric: Cooperative, pleasant affect    RADIOLOGY/PROCEDURES    XR WRIST RIGHT (MIN 3 VIEWS)   Preliminary Result   No evidence of acute osseous abnormality involving the right wrist.  If there   is concern for occult scaphoid fracture, recommend repeat radiographs in 7-10   days. XR HAND RIGHT (MIN 3 VIEWS)   Final Result   No evidence of acute osseous abnormality involving the right hand.   If there   is concern for occult scaphoid fracture, recommend repeat radiographs in 7-10   days. ED COURSE & MEDICAL DECISION MAKING      Patient presents as above. Patient provided Tylenol for pain. Right hand and wrist x-ray shows no acute osseous abnormality. Patient does have positive snuffbox tenderness. I discussed possibility of occult fracture. Placed in Velcro thumb spica wrist splint. Recommend follow-up with orthopedist by calling to schedule appointment for recheck of snuffbox tenderness. I recommend rest, ice, compression, elevation. I recommend over-the-counter ibuprofen and Tylenol as needed for pain. Clinical  IMPRESSION    1. Right wrist pain    2. Pain of right hand          Diagnosis and plan discussed in detail with patient who understands and agrees. Patient agrees to return emergency department if symptoms worsen or any new symptoms develop. Comment: Please note this report has been produced using speech recognition software and may contain errors related to that system including errors in grammar, punctuation, and spelling, as well as words and phrases that may be inappropriate. If there are any questions or concerns please feel free to contact the dictating provider for clarification.         Susan Johnson PA-C  05/16/22 [id number]

## 2022-05-17 NOTE — ED TRIAGE NOTES
Patient presents to the ER with c/o of right wrist pain from a work injury. Patient states this is not a workers compensation. Patient is alert & oriented x 4.

## 2022-06-16 ENCOUNTER — HOSPITAL ENCOUNTER (EMERGENCY)
Age: 22
Discharge: HOME | End: 2022-06-17
Attending: EMERGENCY MEDICINE
Payer: COMMERCIAL

## 2022-06-16 DIAGNOSIS — O46.90 VAGINAL BLEEDING IN PREGNANCY: Primary | ICD-10-CM

## 2022-06-16 LAB
BASOPHILS ABSOLUTE: 0 K/CU MM
BASOPHILS RELATIVE PERCENT: 0.4 % (ref 0–1)
DIFFERENTIAL TYPE: ABNORMAL
EOSINOPHILS ABSOLUTE: 0 K/CU MM
EOSINOPHILS RELATIVE PERCENT: 0.3 % (ref 0–3)
HCT VFR BLD CALC: 37.2 % (ref 37–47)
HEMOGLOBIN: 12.5 GM/DL (ref 12.5–16)
IMMATURE NEUTROPHIL %: 0.4 % (ref 0–0.43)
LYMPHOCYTES ABSOLUTE: 2.3 K/CU MM
LYMPHOCYTES RELATIVE PERCENT: 29.8 % (ref 24–44)
MCH RBC QN AUTO: 32.1 PG (ref 27–31)
MCHC RBC AUTO-ENTMCNC: 33.6 % (ref 32–36)
MCV RBC AUTO: 95.6 FL (ref 78–100)
MONOCYTES ABSOLUTE: 0.6 K/CU MM
MONOCYTES RELATIVE PERCENT: 7 % (ref 0–4)
NUCLEATED RBC %: 0 %
PDW BLD-RTO: 12.6 % (ref 11.7–14.9)
PLATELET # BLD: 213 K/CU MM (ref 140–440)
PMV BLD AUTO: 10.7 FL (ref 7.5–11.1)
RBC # BLD: 3.89 M/CU MM (ref 4.2–5.4)
SEGMENTED NEUTROPHILS ABSOLUTE COUNT: 4.9 K/CU MM
SEGMENTED NEUTROPHILS RELATIVE PERCENT: 62.1 % (ref 36–66)
TOTAL IMMATURE NEUTOROPHIL: 0.03 K/CU MM
TOTAL NUCLEATED RBC: 0 K/CU MM
WBC # BLD: 7.9 K/CU MM (ref 4–10.5)

## 2022-06-16 PROCEDURE — 85025 COMPLETE CBC W/AUTO DIFF WBC: CPT

## 2022-06-16 PROCEDURE — 83690 ASSAY OF LIPASE: CPT

## 2022-06-16 PROCEDURE — 99284 EMERGENCY DEPT VISIT MOD MDM: CPT

## 2022-06-16 PROCEDURE — 84702 CHORIONIC GONADOTROPIN TEST: CPT

## 2022-06-16 PROCEDURE — 80053 COMPREHEN METABOLIC PANEL: CPT

## 2022-06-16 PROCEDURE — 86900 BLOOD TYPING SEROLOGIC ABO: CPT

## 2022-06-16 PROCEDURE — 86901 BLOOD TYPING SEROLOGIC RH(D): CPT

## 2022-06-16 ASSESSMENT — PAIN DESCRIPTION - ORIENTATION: ORIENTATION: LOWER

## 2022-06-16 ASSESSMENT — PAIN SCALES - GENERAL: PAINLEVEL_OUTOF10: 2

## 2022-06-16 ASSESSMENT — PAIN DESCRIPTION - PAIN TYPE: TYPE: ACUTE PAIN

## 2022-06-16 ASSESSMENT — PAIN DESCRIPTION - LOCATION: LOCATION: ABDOMEN

## 2022-06-16 ASSESSMENT — PAIN DESCRIPTION - FREQUENCY: FREQUENCY: INTERMITTENT

## 2022-06-16 ASSESSMENT — PAIN DESCRIPTION - DESCRIPTORS: DESCRIPTORS: CRAMPING

## 2022-06-17 ENCOUNTER — APPOINTMENT (OUTPATIENT)
Dept: ULTRASOUND IMAGING | Age: 22
End: 2022-06-17
Payer: COMMERCIAL

## 2022-06-17 VITALS
OXYGEN SATURATION: 100 % | SYSTOLIC BLOOD PRESSURE: 112 MMHG | TEMPERATURE: 98.4 F | RESPIRATION RATE: 16 BRPM | HEART RATE: 82 BPM | DIASTOLIC BLOOD PRESSURE: 68 MMHG

## 2022-06-17 LAB
ABO/RH: NORMAL
ALBUMIN SERPL-MCNC: 4.3 GM/DL (ref 3.4–5)
ALP BLD-CCNC: 52 IU/L (ref 40–129)
ALT SERPL-CCNC: 11 U/L (ref 10–40)
ANION GAP SERPL CALCULATED.3IONS-SCNC: 11 MMOL/L (ref 4–16)
AST SERPL-CCNC: 15 IU/L (ref 15–37)
BILIRUB SERPL-MCNC: 0.3 MG/DL (ref 0–1)
BUN BLDV-MCNC: 7 MG/DL (ref 6–23)
CALCIUM SERPL-MCNC: 9 MG/DL (ref 8.3–10.6)
CHLORIDE BLD-SCNC: 103 MMOL/L (ref 99–110)
CO2: 22 MMOL/L (ref 21–32)
CREAT SERPL-MCNC: 0.5 MG/DL (ref 0.6–1.1)
GFR AFRICAN AMERICAN: >60 ML/MIN/1.73M2
GFR NON-AFRICAN AMERICAN: >60 ML/MIN/1.73M2
GLUCOSE BLD-MCNC: 88 MG/DL (ref 70–99)
GONADOTROPIN, CHORIONIC (HCG) QUANT: NORMAL UIU/ML
POTASSIUM SERPL-SCNC: 3.6 MMOL/L (ref 3.5–5.1)
SODIUM BLD-SCNC: 136 MMOL/L (ref 135–145)
TOTAL PROTEIN: 6.9 GM/DL (ref 6.4–8.2)

## 2022-06-17 PROCEDURE — 2580000003 HC RX 258: Performed by: EMERGENCY MEDICINE

## 2022-06-17 PROCEDURE — 93975 VASCULAR STUDY: CPT

## 2022-06-17 PROCEDURE — 76817 TRANSVAGINAL US OBSTETRIC: CPT

## 2022-06-17 RX ORDER — 0.9 % SODIUM CHLORIDE 0.9 %
1000 INTRAVENOUS SOLUTION INTRAVENOUS ONCE
Status: COMPLETED | OUTPATIENT
Start: 2022-06-17 | End: 2022-06-17

## 2022-06-17 RX ADMIN — SODIUM CHLORIDE 1000 ML: 9 INJECTION, SOLUTION INTRAVENOUS at 01:16

## 2022-06-17 ASSESSMENT — PAIN - FUNCTIONAL ASSESSMENT: PAIN_FUNCTIONAL_ASSESSMENT: NONE - DENIES PAIN

## 2022-06-17 NOTE — ED PROVIDER NOTES
EMERGENCY DEPARTMENT ENCOUNTER      CHIEF COMPLAINT:   Vaginal Bleeding in pregnancy    HPI: Ronn Graham is a 19 y.o. female who presents to the emergency department complaining of vaginal bleeding in first trimester pregnancy. The patient states that the symptoms started today. She has been spotting and passing small clots. The bleeding has been intermittent. She denies any associated abdominal pain or cramping. . There are no exacerbating or alleviating factors. LMP is 5/2/2022. Denies fevers, chills, chest pain, shortness of breath, abdominal pain, flank pain, dysuria, hematuria or any other complaints. REVIEW OF SYSTEMS:   Constitutional:  Denies fever or chills  Eyes:  Denies change in visual acuity  HENT:  Denies nasal congestion or sore throat  Respiratory:  Denies cough or shortness of breath  Cardiovascular:  Denies chest pain or edema  GI:  Denies abdominal pain, nausea, vomiting, bloody stools or diarrhea  : See HPI  Musculoskeletal:  Denies back pain or joint pain  Integument:  Denies rash  Neurologic:  Denies headache, focal weakness or sensory changes  \"Remaining review of systems reviewed and negative. I have reviewed the nursing triage documentation and agree unless otherwise noted below. \"      PAST MEDICAL HISTORY:   Past Medical History:   Diagnosis Date    ADHD (attention deficit hyperactivity disorder)     Anxiety     Asthma     Depression        CURRENT MEDICATIONS:   Home medications reviewed. SURGICAL HISTORY:   History reviewed. No pertinent surgical history. FAMILY HISTORY:   History reviewed. No pertinent family history.     SOCIAL HISTORY:   Social History     Socioeconomic History    Marital status: Single     Spouse name: Not on file    Number of children: Not on file    Years of education: Not on file    Highest education level: Not on file   Occupational History    Not on file   Tobacco Use    Smoking status: Current Some Day Smoker     Packs/day: 0.50 Types: Cigarettes    Smokeless tobacco: Never Used   Vaping Use    Vaping Use: Some days   Substance and Sexual Activity    Alcohol use: Yes     Comment: occ    Drug use: No    Sexual activity: Not on file   Other Topics Concern    Not on file   Social History Narrative    Not on file     Social Determinants of Health     Financial Resource Strain:     Difficulty of Paying Living Expenses: Not on file   Food Insecurity:     Worried About Running Out of Food in the Last Year: Not on file    Herminia of Food in the Last Year: Not on file   Transportation Needs:     Lack of Transportation (Medical): Not on file    Lack of Transportation (Non-Medical): Not on file   Physical Activity:     Days of Exercise per Week: Not on file    Minutes of Exercise per Session: Not on file   Stress:     Feeling of Stress : Not on file   Social Connections:     Frequency of Communication with Friends and Family: Not on file    Frequency of Social Gatherings with Friends and Family: Not on file    Attends Pentecostal Services: Not on file    Active Member of [address] [place] [address] or Organizations: Not on file    Attends Club or Organization Meetings: Not on file    Marital Status: Not on file   Intimate Partner Violence:     Fear of Current or Ex-Partner: Not on file    Emotionally Abused: Not on file    Physically Abused: Not on file    Sexually Abused: Not on file   Housing Stability:     Unable to Pay for Housing in the Last Year: Not on file    Number of [place] in the Last Year: Not on file    Unstable Housing in the Last Year: Not on file       ALLERGIES: Bee venom    PHYSICAL EXAM:  VITAL SIGNS:   ED Triage Vitals [06/16/22 2155]   Enc Vitals Group      /87      Heart Rate 90      Resp 18      Temp 98.4 °F (36.9 °C)      Temp Source Oral      SpO2 100 %      Weight       Height       Head Circumference       Peak Flow       Pain Score       Pain Loc       Pain Edu? Excl. in [address]?       Constitutional: Non-toxic appearance  HENT: Normocephalic, Atraumatic, Bilateral external ears normal, Oropharynx moist, No oral exudates, Nose normal.  Eyes:  PERRL, Conjunctiva normal, No discharge. Neck: Normal range of motion, No tenderness, Supple, No stridor, No lymphadenopathy . Cardiovascular:  Normal heart rate, Normal rhythm  Pulmonary/Chest:  Normal breath sounds, No respiratory distress, No wheezing  Abdomen: Bowel sounds normal, Soft, No tenderness, No masses, No pulsatile masses  Extremities:  Normal range of motion, Intact distal pulses, No edema, No tenderness  Neurologic:  Alert & oriented x 3, Normal motor function, Sensation intact to light touch throughout, No focal deficits  Skin:  Warm, Dry, No erythema, No rash      EKG Interpretation  None    Radiology / Procedures:       US OB TRANSVAGINAL (Preliminary result)  Result time 06/17/22 07:21:37  Preliminary result by Gilford Alexanders, MD (06/17/22 07:21:37)                Impression:    1. Single live intrauterine pregnancy with an estimated gestational age of 11   weeks, 5 days and an estimated due date of 02/12/2023.   2. Normal bilateral ovaries. Narrative:    EXAMINATION:   FIRST TRIMESTER OBSTETRIC ULTRASOUND; DOPPLER EVALUATION OF THE PELVIS     6/17/2022     TECHNIQUE:   Transvaginal first trimester obstetric pelvic ultrasound was performed with   color Doppler flow evaluation.; Duplex ultrasound using B-mode/gray scaled   imaging and Doppler spectral analysis and color flow was obtained of the   pelvis. COMPARISON:   None     HISTORY:   ORDERING SYSTEM PROVIDED HISTORY: Vaginal bleeding in pregnancy, evaluate for   ectopic   TECHNOLOGIST PROVIDED HISTORY:     Reason for exam:->Vaginal bleeding in pregnancy, evaluate for ectopic   Reason for Exam: Vaginal bleeding     FINDINGS:   Uterus: 7.7 x 8.6 x 5.2 cm     Gestational Sac(s):  Single normal appearing gestational sac.  No evidence of   subchorionic hemorrhage.      Yolk Sac:  Present Fetal Pole:  Single fetal pole. Crown Rump Length:  Too small for accurate measurement. Fetal Heart Rate:  96     Right ovary: 2.1 x 1.8 x 1.7 cm.  Normal arterial and venous flow. Left ovary: 1.5 x 1.5 x 1.2 cm.  Normal arterial and venous flow. Free fluid: None. Measurements:     Estimated gestational age by current ultrasound: 5 weeks, 5 days     Estimated gestational by LMP/prior ultrasound: 6 weeks, 4 days     Estimated Due Date: 02/12/2023                 Preliminary result by Azucena Noe MD (06/17/22 04:04:56)                Impression:    1. Single live intrauterine pregnancy with an estimated gestational age of 11   weeks, 5 days and an estimated due date of 02/12/2023.   2. Normal bilateral ovaries. Labs Reviewed   CBC WITH AUTO DIFFERENTIAL - Abnormal; Notable for the following components:       Result Value    RBC 3.89 (*)     MCH 32.1 (*)     Monocytes % 7.0 (*)     All other components within normal limits   COMPREHENSIVE METABOLIC PANEL - Abnormal; Notable for the following components:    CREATININE 0.5 (*)     All other components within normal limits   HCG, QUANTITATIVE, PREGNANCY   ABO/RH       ED COURSE & MEDICAL DECISION MAKING:  Pertinent Labs & Imaging studies reviewed. (See chart for details)  On exam, the patient is afebrile and nontoxic appearing. She is hemodynamically stable and neurologically intact. Labs are obtained and hemoglobin is normal at 12.5. Beta hCG is 21,181. Blood type is O+ and RhoGAM is not indicated. Transvaginal ultrasound was obtained and shows a single live IUP with an estimated gestational age of 10 weeks and 5 days with an estimated due date of 2/12/2023. The ovaries are normal bilaterally. The patient was treated with IV normal saline. I suspect that the patient has vaginal bleeding in early pregnancy.   I have a low suspicion for active miscarriage, ectopic pregnancy, cervicitis, hemorrhage, acute blood loss anemia,or acute surgical abdomen. I feel that the patient is stable for outpatient management with follow up in 2-3 days. She is given return precautions. The patient verbalized understanding, was agreeable with plan, and the patient was discharged home in stable condition. Clinical Impression:  1. Vaginal bleeding in pregnancy        Disposition referral (if applicable):  Corrine Mckeon MD  [address]   [phone]    Schedule an appointment as soon as possible for a visit       [facility] Emergency Department  [facility] [address]  [phone]  Go to   If symptoms worsen      Disposition medications (if applicable):  Discharge Medication List as of 6/17/2022  4:24 AM            Comment: Please note this report has been produced using speech recognition software and may contain errors related to that system including errors in grammar, punctuation, and spelling, as well as words and phrases that may be inappropriate. If there are any questions or concerns please feel free to contact the dictating provider for clarification.         Gerard Gonzalez MD  06/18/22 [id number]

## 2022-07-01 ENCOUNTER — HOSPITAL ENCOUNTER (EMERGENCY)
Age: 22
Discharge: HOME | End: 2022-07-02
Attending: EMERGENCY MEDICINE
Payer: COMMERCIAL

## 2022-07-01 ENCOUNTER — APPOINTMENT (OUTPATIENT)
Dept: ULTRASOUND IMAGING | Age: 22
End: 2022-07-01
Payer: COMMERCIAL

## 2022-07-01 DIAGNOSIS — O20.0 THREATENED MISCARRIAGE: ICD-10-CM

## 2022-07-01 DIAGNOSIS — O46.90 VAGINAL BLEEDING IN PREGNANCY: Primary | ICD-10-CM

## 2022-07-01 LAB
ALBUMIN SERPL-MCNC: 4.4 GM/DL (ref 3.4–5)
ALP BLD-CCNC: 50 IU/L (ref 40–129)
ALT SERPL-CCNC: 14 U/L (ref 10–40)
ANION GAP SERPL CALCULATED.3IONS-SCNC: 10 MMOL/L (ref 4–16)
AST SERPL-CCNC: 17 IU/L (ref 15–37)
BACTERIA: NEGATIVE /HPF
BASOPHILS ABSOLUTE: 0 K/CU MM
BASOPHILS RELATIVE PERCENT: 0.4 % (ref 0–1)
BILIRUB SERPL-MCNC: 0.2 MG/DL (ref 0–1)
BILIRUBIN URINE: NEGATIVE MG/DL
BLOOD, URINE: ABNORMAL
BUN BLDV-MCNC: 9 MG/DL (ref 6–23)
CALCIUM SERPL-MCNC: 9.1 MG/DL (ref 8.3–10.6)
CHLORIDE BLD-SCNC: 104 MMOL/L (ref 99–110)
CLARITY: ABNORMAL
CO2: 22 MMOL/L (ref 21–32)
COLOR: YELLOW
CREAT SERPL-MCNC: 0.4 MG/DL (ref 0.6–1.1)
DIFFERENTIAL TYPE: ABNORMAL
EOSINOPHILS ABSOLUTE: 0 K/CU MM
EOSINOPHILS RELATIVE PERCENT: 0.5 % (ref 0–3)
GFR AFRICAN AMERICAN: >60 ML/MIN/1.73M2
GFR NON-AFRICAN AMERICAN: >60 ML/MIN/1.73M2
GLUCOSE BLD-MCNC: 79 MG/DL (ref 70–99)
GLUCOSE, URINE: NEGATIVE MG/DL
GONADOTROPIN, CHORIONIC (HCG) QUANT: NORMAL UIU/ML
HCT VFR BLD CALC: 38.1 % (ref 37–47)
HEMOGLOBIN: 12.8 GM/DL (ref 12.5–16)
IMMATURE NEUTROPHIL %: 0.4 % (ref 0–0.43)
KETONES, URINE: ABNORMAL MG/DL
LEUKOCYTE ESTERASE, URINE: ABNORMAL
LYMPHOCYTES ABSOLUTE: 2.1 K/CU MM
LYMPHOCYTES RELATIVE PERCENT: 25.6 % (ref 24–44)
MCH RBC QN AUTO: 32.1 PG (ref 27–31)
MCHC RBC AUTO-ENTMCNC: 33.6 % (ref 32–36)
MCV RBC AUTO: 95.5 FL (ref 78–100)
MONOCYTES ABSOLUTE: 0.6 K/CU MM
MONOCYTES RELATIVE PERCENT: 7.9 % (ref 0–4)
MUCUS: ABNORMAL HPF
NITRITE URINE, QUANTITATIVE: NEGATIVE
NUCLEATED RBC %: 0 %
PDW BLD-RTO: 12.5 % (ref 11.7–14.9)
PH, URINE: 6 (ref 5–8)
PLATELET # BLD: 217 K/CU MM (ref 140–440)
PMV BLD AUTO: 10.7 FL (ref 7.5–11.1)
POTASSIUM SERPL-SCNC: 4.2 MMOL/L (ref 3.5–5.1)
PROTEIN UA: NEGATIVE MG/DL
RBC # BLD: 3.99 M/CU MM (ref 4.2–5.4)
RBC URINE: 15 /HPF (ref 0–6)
SEGMENTED NEUTROPHILS ABSOLUTE COUNT: 5.3 K/CU MM
SEGMENTED NEUTROPHILS RELATIVE PERCENT: 65.2 % (ref 36–66)
SODIUM BLD-SCNC: 136 MMOL/L (ref 135–145)
SPECIFIC GRAVITY UA: 1.02 (ref 1–1.03)
SQUAMOUS EPITHELIAL: 6 /HPF
TOTAL IMMATURE NEUTOROPHIL: 0.03 K/CU MM
TOTAL NUCLEATED RBC: 0 K/CU MM
TOTAL PROTEIN: 7 GM/DL (ref 6.4–8.2)
TRICHOMONAS: ABNORMAL /HPF
UROBILINOGEN, URINE: 1 MG/DL (ref 0.2–1)
WBC # BLD: 8.1 K/CU MM (ref 4–10.5)
WBC UA: 21 /HPF (ref 0–5)

## 2022-07-01 PROCEDURE — 85025 COMPLETE CBC W/AUTO DIFF WBC: CPT

## 2022-07-01 PROCEDURE — 80053 COMPREHEN METABOLIC PANEL: CPT

## 2022-07-01 PROCEDURE — 84702 CHORIONIC GONADOTROPIN TEST: CPT

## 2022-07-01 PROCEDURE — 81001 URINALYSIS AUTO W/SCOPE: CPT

## 2022-07-01 PROCEDURE — 99284 EMERGENCY DEPT VISIT MOD MDM: CPT

## 2022-07-01 PROCEDURE — 76801 OB US < 14 WKS SINGLE FETUS: CPT

## 2022-07-02 VITALS
OXYGEN SATURATION: 99 % | HEART RATE: 68 BPM | SYSTOLIC BLOOD PRESSURE: 124 MMHG | WEIGHT: 175 LBS | TEMPERATURE: 98.3 F | RESPIRATION RATE: 16 BRPM | BODY MASS INDEX: 29.16 KG/M2 | DIASTOLIC BLOOD PRESSURE: 68 MMHG | HEIGHT: 65 IN

## 2022-07-02 ASSESSMENT — ENCOUNTER SYMPTOMS
NAUSEA: 0
EYE DISCHARGE: 0
BACK PAIN: 0
ABDOMINAL PAIN: 1
EYE PAIN: 0
COUGH: 0
RHINORRHEA: 0
VOMITING: 0
SHORTNESS OF BREATH: 0
SORE THROAT: 0

## 2022-07-02 ASSESSMENT — PAIN - FUNCTIONAL ASSESSMENT: PAIN_FUNCTIONAL_ASSESSMENT: NONE - DENIES PAIN

## 2022-07-02 NOTE — ED PROVIDER NOTES
[address] ENCOUNTER      Pt Name: Nasra Turner  MRN: [id number]  [place] 2000  Date of evaluation: 7/1/2022  Provider: Terie Phoenix, MD    CHIEF COMPLAINT       Chief Complaint   Patient presents with    Vaginal Bleeding     pt states she is 7 weeks pregnant, has had heavy vaginal bleeding and cramping. pt also states testing positive for covid          HISTORY OF PRESENT ILLNESS      Nasra Turner is a 19 y.o. female who presents to the emergency department  for   Chief Complaint   Patient presents with    Vaginal Bleeding     pt states she is 7 weeks pregnant, has had heavy vaginal bleeding and cramping. pt also states testing positive for covid        68-year-old female presents with vaginal bleeding. She does have a confirmatory uterine pregnancy. She reports that she should be about 7 weeks pregnant. She was seen in this emergency department previously for bleeding in pregnancy. She did undergo an ultrasound which did confirm intrauterine pregnancy. Her blood type is Rh+. She reports that since her initial emergency department visit she had a subsequent visit with pregnancy resources that also showed intrauterine pregnancy. She reports that she has been on strict pelvic rest since undergoing that prior episode of vaginal bleeding. From review of records, her initial ultrasound did show a pregnancy that was about 5 weeks gestational age. She does endorse some lower abdominal pain. She does note that she began having some larger blood clots and so came to the emergency room for evaluation. Denies abdominal trauma or injury. No abnormal vaginal bleeding. This is her first pregnancy. She denies any respiratory symptoms. GCS of 15. She does not identify exacerbating alleviating factors. She is not anticoagulated. Nursing Notes, Triage Notes & Vital Signs were reviewed.       REVIEW OF SYSTEMS    (2-9 systems for level 4, 10 or more for level 5)     Review of Systems   Constitutional: Negative for chills and fever. HENT: Negative for congestion, rhinorrhea and sore throat. Eyes: Negative for pain and discharge. Respiratory: Negative for cough and shortness of breath. Cardiovascular: Negative for chest pain and palpitations. Gastrointestinal: Positive for abdominal pain. Negative for nausea and vomiting. Endocrine: Negative for polydipsia and polyuria. Genitourinary: Positive for vaginal bleeding. Negative for dysuria and vaginal discharge. Musculoskeletal: Negative for back pain and neck pain. Skin: Negative for pallor and wound. Neurological: Negative for dizziness, facial asymmetry, light-headedness, numbness and headaches. Psychiatric/Behavioral: Negative for confusion. Except as noted above the remainder of the review of systems was reviewed and negative. PAST MEDICAL HISTORY     Past Medical History:   Diagnosis Date    ADHD (attention deficit hyperactivity disorder)     Anxiety     Asthma     Depression        Prior to Admission medications    Medication Sig Start Date End Date Taking? Authorizing Provider   naproxen (NAPROSYN) 500 MG tablet Take 1 tablet by mouth 2 times daily 1/2/22   Evelyn Arrieta, DO   acetaminophen (TYLENOL) 325 MG tablet Take 2 tablets by mouth every 6 hours as needed for Pain 1/2/22   Evelyn Arrieta, DO   ondansetron (ZOFRAN ODT) 4 MG disintegrating tablet Take 1 tablet by mouth every 8 hours as needed for Nausea 1/2/22   Evelyn Arrieta, DO   lidocaine (LIDODERM) 5 % Place 1 patch onto the skin daily 12 hours on, 12 hours off. 1/2/22   Evelyn Arrieta, DO   albuterol sulfate HFA (VENTOLIN HFA) 108 (90 Base) MCG/ACT inhaler Inhale 2 puffs into the lungs 4 times daily as needed for Wheezing With spacer.  12/5/21   Freida Shepherd MD   Dextromethorphan-guaiFENesin [facility] DM)  MG TB12 Take 1-2 tablets by mouth 2 times daily as needed (Cough, congestion, sinus pressure) 12/5/21   Rea Malave MD   loratadine (CLARITIN) 10 MG tablet Take 1 tablet by mouth daily as needed (Congestion, sinus pressure) 12/5/21   Rea Malave MD        Patient Active Problem List   Diagnosis    Right-sided Bell's palsy         SURGICAL HISTORY     No past surgical history on file. CURRENT MEDICATIONS       Discharge Medication List as of 7/2/2022 12:28 AM      CONTINUE these medications which have NOT CHANGED    Details   naproxen (NAPROSYN) 500 MG tablet Take 1 tablet by mouth 2 times daily, Disp-60 tablet, R-0Print      acetaminophen (TYLENOL) 325 MG tablet Take 2 tablets by mouth every 6 hours as needed for Pain, Disp-120 tablet, R-3Print      ondansetron (ZOFRAN ODT) 4 MG disintegrating tablet Take 1 tablet by mouth every 8 hours as needed for Nausea, Disp-15 tablet, R-0Print      lidocaine (LIDODERM) 5 % Place 1 patch onto the skin daily 12 hours on, 12 hours off., Disp-30 patch, R-0Print      albuterol sulfate HFA (VENTOLIN HFA) 108 (90 Base) MCG/ACT inhaler Inhale 2 puffs into the lungs 4 times daily as needed for Wheezing With spacer., Disp-54 g, R-1Normal      Dextromethorphan-guaiFENesin (MUCINEX DM)  MG TB12 Take 1-2 tablets by mouth 2 times daily as needed (Cough, congestion, sinus pressure), Disp-28 tablet, R-0Normal      loratadine (CLARITIN) 10 MG tablet Take 1 tablet by mouth daily as needed (Congestion, sinus pressure), Disp-20 tablet, R-0Normal             ALLERGIES     Bee venom    FAMILY HISTORY     No family history on file.        SOCIAL HISTORY       Social History     Socioeconomic History    Marital status: Single     Spouse name: Not on file    Number of children: Not on file    Years of education: Not on file    Highest education level: Not on file   Occupational History    Not on file   Tobacco Use    Smoking status: Current Some Day Smoker     Packs/day: 0.50     Types: Cigarettes    Smokeless tobacco: Never Nose: No congestion or rhinorrhea. Mouth/Throat:      Mouth: Mucous membranes are moist.      Pharynx: No oropharyngeal exudate or posterior oropharyngeal erythema. Eyes:      Extraocular Movements: Extraocular movements intact. Pupils: Pupils are equal, round, and reactive to light. Cardiovascular:      Rate and Rhythm: Normal rate. Pulmonary:      Effort: Pulmonary effort is normal. No respiratory distress. Abdominal:      Palpations: Abdomen is soft. Tenderness: There is no abdominal tenderness. There is no guarding. Musculoskeletal:         General: No swelling or tenderness. Normal range of motion. Cervical back: Normal range of motion and neck supple. No tenderness. Skin:     General: Skin is warm. Capillary Refill: Capillary refill takes less than 2 seconds. Findings: No erythema or lesion. Neurological:      General: No focal deficit present. Mental Status: She is alert.          DIAGNOSTIC RESULTS     Labs Reviewed   COMPREHENSIVE METABOLIC PANEL W/ REFLEX TO MG FOR LOW K - Abnormal; Notable for the following components:       Result Value    CREATININE 0.4 (*)     All other components within normal limits   CBC WITH AUTO DIFFERENTIAL - Abnormal; Notable for the following components:    RBC 3.99 (*)     MCH 32.1 (*)     Monocytes % 7.9 (*)     All other components within normal limits   URINALYSIS WITH MICROSCOPIC - Abnormal; Notable for the following components:    Clarity, UA SLIGHTLY CLOUDY (*)     Ketones, Urine TRACE (*)     Blood, Urine LARGE NUMBER OR AMOUNT OF  (*)     Leukocyte Esterase, Urine SMALL NUMBER OR AMOUNT OBSERVED (*)     RBC, UA 15 (*)     WBC, UA 21 (*)     Mucus, UA FEW (*)     All other components within normal limits   HCG, QUANTITATIVE, PREGNANCY          RADIOLOGY:     Non-plain film images such as CT, Ultrasound and MRI are read by the radiologist. Plain radiographic images are visualized and preliminarily interpreted by the emergency physician. Interpretation per the Radiologist below, if available at the time of this note:    US OB LESS THAN 14 [facility]   Preliminary Result   1. Single live intrauterine pregnancy with an average gestational age of 6   weeks, 0 days and an estimated delivery date of 02/12/2023.   2. Debris appears to be present within the gestational sac. Close interval   follow-up is recommended. ED BEDSIDE ULTRASOUND:   Performed by ED Physician Carlton Rubio MD       LABS:  Labs Reviewed   COMPREHENSIVE METABOLIC PANEL W/ REFLEX TO MG FOR LOW K - Abnormal; Notable for the following components:       Result Value    CREATININE 0.4 (*)     All other components within normal limits   CBC WITH AUTO DIFFERENTIAL - Abnormal; Notable for the following components:    RBC 3.99 (*)     MCH 32.1 (*)     Monocytes % 7.9 (*)     All other components within normal limits   URINALYSIS WITH MICROSCOPIC - Abnormal; Notable for the following components:    Clarity, UA SLIGHTLY CLOUDY (*)     Ketones, Urine TRACE (*)     Blood, Urine LARGE NUMBER OR AMOUNT OF  (*)     Leukocyte Esterase, Urine SMALL NUMBER OR AMOUNT OBSERVED (*)     RBC, UA 15 (*)     WBC, UA 21 (*)     Mucus, UA FEW (*)     All other components within normal limits   HCG, QUANTITATIVE, PREGNANCY       All other labs were within normal range or not returned as of this dictation. EMERGENCY DEPARTMENT COURSE and DIFFERENTIAL DIAGNOSIS/MDM:   Vitals:    Vitals:    07/01/22 1918 07/02/22 0031   BP: 134/72 124/68   Pulse: 90 68   Resp: 18 16   Temp: 98.1 °F (36.7 °C) 98.3 °F (36.8 °C)   TempSrc: Oral Oral   SpO2: 100% 99%   Weight: 175 lb (79.4 kg)    Height: 5' 5\" (1.651 m)            MDM  Number of Diagnoses or Management Options  Threatened miscarriage  Vaginal bleeding in pregnancy  Diagnosis management comments: 19 yof presents with vaginal bleeding. She endorses a 7-week pregnancy.   She does endorse having a confirmed intrauterine pregnancy. She was seen in this emergency department a few weeks ago where her pregnancy was confirmed by ultrasound. She was having bleeding at that time. She is been on pelvic rest since that time. She does state having undergone an interval ultrasound and pregnancy resources that also demonstrated intrauterine pregnancy. Her blood type is Rh+. She denies any abdominal trauma or injury. She states she is having some abdominal pain as well as an increase in the bleeding so she comes back to the emergency room for evaluation. She is anticoagulated. She presents unremarkable vital signs. Overall abdomen soft and nonperitoneal.  Labs are obtained. Her hemoglobin is overall unremarkable. Her quantitative hCG is 100,000. Did obtain an ultrasound. Ultrasound does not show a live intrauterine pregnancy in about 8 weeks. Does note some debris in the gestational sac. I did discuss with patient that the constellation of symptoms and the ultrasound findings can mean threatened pregnancy. She is Rh+. She does not require RhoGAM.  She is instructed to follow-up in 2 days for repeat labs and imaging. She is given strict return precautions for worsening symptoms. She will continue her pelvic rest.  She is discharged in stable condition with return precautions       Amount and/or Complexity of Data Reviewed  Decide to obtain previous medical records or to obtain history from someone other than the patient: yes        -  Patient seen and evaluated in the emergency department. -  Triage and nursing notes reviewed and incorporated. -  Old chart records reviewed and incorporated. -  Work-up included:  See above  -  Results discussed with patient. CONSULTS:  None    PROCEDURES:  None performed unless otherwise noted below     Procedures        FINAL IMPRESSION      1. Vaginal bleeding in pregnancy    2.  Threatened miscarriage          DISPOSITION/PLAN   DISPOSITION Decision To Discharge 07/02/2022 12:21:05 AM      PATIENT REFERRED TO:  [facility] Emergency Department  [address] [phone]  [phone]  In 2 days  Please follow-up in 2 days for repeat labs and imaging      DISCHARGE MEDICATIONS:  Discharge Medication List as of 7/2/2022 12:28 AM          ED Provider Disposition Time  DISPOSITION Decision To Discharge 07/02/2022 12:21:05 AM      Appropriate personal protective equipment was worn during the patient's evaluation. These included surgical, eye protection, surgical mask or in 95 respirator and gloves. The patient was also placed in a surgical mask for the prevention of possible spread of respiratory viral illnesses. The Patient was instructed to read the package inserts with any medication that was prescribed. Major potential reactions and medication interactions were discussed. The Patient understands that there are numerous possible adverse reactions not covered. The patient was also instructed to arrange follow-up with his or her primary care provider for review of any pending labwork or incidental findings on any radiology results that were obtained. All efforts were made to discuss any incidental findings that require further monitoring. Controlled Substances Monitoring:     No flowsheet data found.     (Please note that portions of this note were completed with a voice recognition program.  Efforts were made to edit the dictations but occasionally words are mis-transcribed.)    Sylvester Watson MD (electronically signed)  Attending Emergency Physician           Sylvester Watson MD  07/02/22 7437

## 2022-07-04 ENCOUNTER — APPOINTMENT (OUTPATIENT)
Dept: ULTRASOUND IMAGING | Age: 22
End: 2022-07-04
Payer: COMMERCIAL

## 2022-07-04 ENCOUNTER — HOSPITAL ENCOUNTER (EMERGENCY)
Age: 22
Discharge: HOME | End: 2022-07-04
Payer: COMMERCIAL

## 2022-07-04 VITALS
BODY MASS INDEX: 29.16 KG/M2 | WEIGHT: 175 LBS | SYSTOLIC BLOOD PRESSURE: 127 MMHG | TEMPERATURE: 97.9 F | OXYGEN SATURATION: 100 % | DIASTOLIC BLOOD PRESSURE: 62 MMHG | RESPIRATION RATE: 18 BRPM | HEART RATE: 87 BPM | HEIGHT: 65 IN

## 2022-07-04 DIAGNOSIS — Z01.89 ENCOUNTER FOR LABORATORY EXAMINATION: Primary | ICD-10-CM

## 2022-07-04 DIAGNOSIS — O28.3 ABNORMAL OBSTETRIC ULTRASOUND SCAN: ICD-10-CM

## 2022-07-04 LAB — GONADOTROPIN, CHORIONIC (HCG) QUANT: NORMAL UIU/ML

## 2022-07-04 PROCEDURE — 84702 CHORIONIC GONADOTROPIN TEST: CPT

## 2022-07-04 PROCEDURE — 99284 EMERGENCY DEPT VISIT MOD MDM: CPT

## 2022-07-04 PROCEDURE — 76817 TRANSVAGINAL US OBSTETRIC: CPT

## 2022-07-04 PROCEDURE — 93975 VASCULAR STUDY: CPT

## 2022-07-04 NOTE — ED PROVIDER NOTES
eMERGENCY dEPARTMENT eNCOUnter         [address] [facility]    PCP: No primary care provider on file. CHIEF COMPLAINT    Chief Complaint   Patient presents with    Labs Only     here for redraw of Hcg levels- denies no vaginal bleeding       KATELYN Morrison Maude is a 19 y.o. female who presents for hCG lab work. Context is patient was seen on 7/1/2022 for vaginal bleeding and pregnancy. At that time, hCG quant level was 100K however she was told to return back to the ED today for repeat hCG quant level blooddraw and ultrasound as the ultrasound had showed \"debris\" within the gestational sac. Patient is otherwise asymptomatic for any complaints today. Has had resolution of bleeding, no new vaginal discharge, pelvic pain or cramping, nausea or vomiting dizziness lightheadedness or chest pain. G1, P0. No history of miscarriage or ectopic pregnancy in the past.  Patient does plan to establish care with women's physician and surgeon, Dr. Barbie Bolden but has not yet established prenatal care. REVIEW OF SYSTEMS    Constitutional:  No fever, chills  HEENT:  See above  Cardiovascular:  Denies chest pain, palpitations. Respiratory:  Denies wheezes or shortness of breath   GI:  Denies abdominal pain, vomiting, or diarrhea   :  Denies any urinary symptoms or vaginal symptoms. Neurologic:  Denies confusion, light headedness, dizziness, or syncope       All other review of systems are negative  See HPI and nursing notes for additional information       PAST MEDICAL AND SURGICAL HISTORY    Past Medical History:   Diagnosis Date    ADHD (attention deficit hyperactivity disorder)     Anxiety     Asthma     Depression      History reviewed. No pertinent surgical history.     CURRENT MEDICATIONS    Current Outpatient Rx   Medication Sig Dispense Refill    naproxen (NAPROSYN) 500 MG tablet Take 1 tablet by mouth 2 times daily 60 tablet 0    acetaminophen (TYLENOL) 325 MG tablet Take 2 tablets by mouth every 6 hours as needed for Pain 120 tablet 3    ondansetron (ZOFRAN ODT) 4 MG disintegrating tablet Take 1 tablet by mouth every 8 hours as needed for Nausea 15 tablet 0    lidocaine (LIDODERM) 5 % Place 1 patch onto the skin daily 12 hours on, 12 hours off. 30 patch 0    albuterol sulfate HFA (VENTOLIN HFA) 108 (90 Base) MCG/ACT inhaler Inhale 2 puffs into the lungs 4 times daily as needed for Wheezing With spacer. 54 g 1    Dextromethorphan-guaiFENesin (MUCINEX DM)  MG TB12 Take 1-2 tablets by mouth 2 times daily as needed (Cough, congestion, sinus pressure) 28 tablet 0    loratadine (CLARITIN) 10 MG tablet Take 1 tablet by mouth daily as needed (Congestion, sinus pressure) 20 tablet 0       ALLERGIES    Allergies   Allergen Reactions    Bee Venom Anaphylaxis       FAMILY AND SOCIAL HISTORY    History reviewed. No pertinent family history. Social History     Socioeconomic History    Marital status: Single     Spouse name: None    Number of children: None    Years of education: None    Highest education level: None   Occupational History    None   Tobacco Use    Smoking status: Current Some Day Smoker     Packs/day: 0.50     Types: Cigarettes    Smokeless tobacco: Never Used   Vaping Use    Vaping Use: Some days   Substance and Sexual Activity    Alcohol use: Yes     Comment: occ    Drug use: No    Sexual activity: None   Other Topics Concern    None   Social History Narrative    None     Social Determinants of Health     Financial Resource Strain:     Difficulty of Paying Living Expenses: Not on file   Food Insecurity:     Worried About Running Out of Food in the Last Year: Not on file    Herminia of Food in the Last Year: Not on file   Transportation Needs:     Lack of Transportation (Medical): Not on file    Lack of Transportation (Non-Medical):  Not on file   Physical Activity:     Days of Exercise per Week: Not on file    Minutes of Exercise per Session: Not on file   Stress:     Feeling of Stress : Not on file   Social Connections:     Frequency of Communication with Friends and Family: Not on file    Frequency of Social Gatherings with Friends and Family: Not on file    Attends Oriental orthodox Services: Not on file    Active Member of [address] [place] [address] or Organizations: Not on file    Attends Club or Organization Meetings: Not on file    Marital Status: Not on file   Intimate Partner Violence:     Fear of Current or Ex-Partner: Not on file    Emotionally Abused: Not on file    Physically Abused: Not on file    Sexually Abused: Not on file   Housing Stability:     Unable to Pay for Housing in the Last Year: Not on file    Number of [place] in the Last Year: Not on file    Unstable Housing in the Last Year: Not on file       PHYSICAL EXAM    VITAL SIGNS: /62   Pulse 87   Temp 97.9 °F (36.6 °C) (Oral)   Resp 18   Ht 5' 5\" (1.651 m)   Wt 175 lb (79.4 kg)   LMP 01/02/2022   SpO2 100%   BMI 29.12 kg/m²   Constitutional:  Well developed, well nourished, no acute distress, non-toxic appearance   HEENT:  Normocephalic, atraumatic. EOM intact. Conjunctiva normal, sclera non-icteric. Moist mucous membranes. Tolerating all her secretions  Neck/Lymphatics:    - supple, no JVD, no swollen nodes     Respiratory:  Non labored breathing. Clear to auscultation bilateral lung fields, no wheezes/rales/rhonchi. No retractions, no accessory muscle use  Cardiovascular:  Normal rate, normal rhythm   GI:  Soft, no abdominal tenderness, no guarding.   Musculoskeletal:  No obvious deficits, no edema   Integument:  Skin pink, warn, dry, intact       LABS:    Results for orders placed or performed during the hospital encounter of 07/04/22   HCG Serum, Quantitative   Result Value Ref Range    hCG Quant 407839.0 UIU/ML         RADIOLOGY/PROCEDURES         US OB TRANSVAGINAL (Final result)  Result time 07/04/22 22:19:29  Final result by Noelle Rios MD (07/04/22 22:19:29)                Impression:    Single live IUP of gestational age 11 weeks 3 days. Stephanie Gosselin is a small circular   structure within the amnion of uncertain etiology.  Recommend close clinical   and imaging follow-up. Narrative:    EXAMINATION:   FIRST TRIMESTER OBSTETRIC ULTRASOUND     7/4/2022     TECHNIQUE:   Transvaginal first trimester obstetric pelvic ultrasound was performed with   color Doppler flow evaluation. COMPARISON:   07/01/2022     HISTORY:   ORDERING SYSTEM PROVIDED HISTORY: to confirm heartbeat   TECHNOLOGIST PROVIDED HISTORY:     Reason for exam:->to confirm heartbeat     FINDINGS:   Uterus: 9.2 x 5.7 x 8.0 cm     Gestational Sac(s):  Single normal appearing gestational sac.  No evidence of   subchorionic hemorrhage. Stephanie Gosselin is a 5 mm circular structure within the   amnion of uncertain etiology. [place] Sac:  Present     Fetal Pole:  Single fetal pole     Crown Rump Length:  1.9 cm     Fetal Heart Rate:  909     Right ovary: 3.4 x 1.4 x 2.0 cm     Left ovary: 3.5 x 2.6 x 3.7 cm.  Incidental corpus luteum. Free fluid: None     Measurements:     Estimated gestational age by current ultrasound: 8 weeks 3 days     Estimated gestational by LMP/prior ultrasound: 8 weeks 1 day     Estimated Due Date: 02/10/2023                           ED COURSE & MEDICAL DECISION MAKING       Vital signs and nursing notes reviewed during ED course. I have independently evaluated this patient . Supervising MD - Dr. Lorena Haider - present in the Emergency Department, available for consultation, throughout entirety of  patient care. All pertinent Lab data and radiographic results reviewed with patient at bedside. The patient and/or the family were informed of the results of any tests/labs/imaging, the treatment plan, and time was allotted to answer questions.          Differential Diagnoses:  Acute Bronchitis, Pneumonia, Airway Obstruction, Upper Respiratory Viral infection, Sinusitis, Pharyngitis, Sharon-Tonsillar Abscess,    Clinical  IMPRESSION    1. Encounter for laboratory examination    2. Abnormal obstetric ultrasound scan        Patient presents for repeat hCG quant level lab draw and ultrasound. On exam, well-appearing nontoxic 68-year-old female, no acute distress. Vitals are stable, appears hemodynamically intact. Currently asymptomatic for any abdominal pelvic pain or vaginal bleeding at this time. On chart review, OB ultrasound on 7/1/2022 does show single live intrauterine pregnancy with estimated gestational age of 8 weeks 0 days with debris present within the gestational sac. Fetal heart rate 148 bpm.  EKG quant level on 7/1/2022 was 101,499. Today, there has been a slight drop to 101,065. At this point, as patient is not having active vaginal bleeding or pelvic pain, plan to consult with OB/GYN for recommendations including if a repeat OB ultrasound is indicated at this time. I did consult with Dr. Kenyatta Hutchinson - OB/GYN - and discussed patient's history, ED presentation/course including any pertinent laboratory findings and imaging study findings. He/she would recommend OB ultrasound here to confirm heartbeat but otherwise patient will be appropriate discharge home for continued outpatient prenatal care. OB ultrasound does reveal single live IUP of estimated gestational age is 8 weeks 3 days with small circular structure within the amnion of uncertain etiology measuring 5 mm. Fetal heart rate 175 bpm.    I did call back and speak with Dr. Kenyatta Hutchinson to update on abnormal ultrasound findings. She is comfortable with patient being discharged home and to establish prenatal care. Recommend giving instructions to patient that she should only return back to the ED for any severe heavy vaginal bleeding/hemorrhage, otherwise can have serial labs and ultrasounds on outpatient basis. Plan was discussed with patient who verbalizes understanding and agreement. Discussed with patient that she should follow-up with OB on outpatient basis, continue prenatal care and bleeding precautions as well as pelvic rest.  On chart review, previous Rh status have been positive so no RhoGAM is needed. She is discharged at this time in stable condition with outpatient follow-up. Return warnings again discussed. Diagnosis and plan discussed in detail with patient who understands and agrees. Patient agrees to return emergency department if symptoms worsen or any new symptoms develop. Comment: Please note this report has been produced using speech recognition software and may contain errors related to that system including errors in grammar, punctuation, and spelling, as well as words and phrases that may be inappropriate. If there are any questions or concerns please feel free to contact the dictating provider for clarification.         Lou Camarena PA-C  07/04/22 0631

## 2022-07-04 NOTE — ED NOTES
Report given to Anderson Shanks RN. Care transferred at this time.      Edouard Edwards RN  07/04/22 9435

## 2022-08-01 ENCOUNTER — HOSPITAL ENCOUNTER (OUTPATIENT)
Age: 22
Discharge: HOME | End: 2022-08-01
Attending: OBSTETRICS & GYNECOLOGY | Admitting: OBSTETRICS & GYNECOLOGY
Payer: COMMERCIAL

## 2022-08-01 VITALS
HEART RATE: 84 BPM | BODY MASS INDEX: 29.16 KG/M2 | HEIGHT: 65 IN | DIASTOLIC BLOOD PRESSURE: 61 MMHG | OXYGEN SATURATION: 100 % | WEIGHT: 175 LBS | RESPIRATION RATE: 16 BRPM | SYSTOLIC BLOOD PRESSURE: 132 MMHG | TEMPERATURE: 97 F

## 2022-08-01 PROCEDURE — 99213 OFFICE O/P EST LOW 20 MIN: CPT
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
## 2022-08-02 NOTE — FLOWSHEET NOTE
Doppler heart tones in the 150's. Pt denies vaginal bleeding. Plan of care discussed. Pt voices understanding.

## 2022-11-13 ENCOUNTER — HOSPITAL ENCOUNTER (OUTPATIENT)
Age: 22
Discharge: HOME | End: 2022-11-13
Attending: OBSTETRICS & GYNECOLOGY | Admitting: OBSTETRICS & GYNECOLOGY
Payer: COMMERCIAL

## 2022-11-13 VITALS
SYSTOLIC BLOOD PRESSURE: 115 MMHG | HEART RATE: 83 BPM | RESPIRATION RATE: 16 BRPM | DIASTOLIC BLOOD PRESSURE: 55 MMHG | BODY MASS INDEX: 31.65 KG/M2 | TEMPERATURE: 98.8 F | HEIGHT: 65 IN | WEIGHT: 190 LBS | OXYGEN SATURATION: 96 %

## 2022-11-13 PROBLEM — Z33.1 PREGNANCY AS INCIDENTAL FINDING: Status: ACTIVE | Noted: 2022-11-13

## 2022-11-13 LAB
AMPHETAMINES: NEGATIVE
BACTERIA: NEGATIVE /HPF
BARBITURATE SCREEN URINE: NEGATIVE
BENZODIAZEPINE SCREEN, URINE: NEGATIVE
BILIRUBIN URINE: NEGATIVE MG/DL
BLOOD, URINE: NEGATIVE
CANNABINOID SCREEN URINE: NEGATIVE
CLARITY: ABNORMAL
COCAINE METABOLITE: NEGATIVE
COLOR: YELLOW
GLUCOSE, URINE: NEGATIVE MG/DL
KETONES, URINE: NEGATIVE MG/DL
LEUKOCYTE ESTERASE, URINE: ABNORMAL
MUCUS: ABNORMAL HPF
NITRITE URINE, QUANTITATIVE: NEGATIVE
OPIATES, URINE: NEGATIVE
OXYCODONE: NEGATIVE
PH, URINE: 7 (ref 5–8)
PHENCYCLIDINE, URINE: NEGATIVE
PROTEIN UA: NEGATIVE MG/DL
RBC URINE: ABNORMAL /HPF (ref 0–6)
SPECIFIC GRAVITY UA: 1.01 (ref 1–1.03)
SQUAMOUS EPITHELIAL: 16 /HPF
TRICHOMONAS: ABNORMAL /HPF
UROBILINOGEN, URINE: 2 MG/DL (ref 0.2–1)
WBC UA: 2 /HPF (ref 0–5)

## 2022-11-13 PROCEDURE — 80307 DRUG TEST PRSMV CHEM ANLYZR: CPT

## 2022-11-13 PROCEDURE — 81001 URINALYSIS AUTO W/SCOPE: CPT

## 2022-11-13 PROCEDURE — 99213 OFFICE O/P EST LOW 20 MIN: CPT

## 2022-11-14 NOTE — FLOWSHEET NOTE
EFM and TOCO on. Abd soft and non-tender. Pt denies vaginal bleeding, leaking fluid or ctx. Pt denies fetal movement today. Fetal movement audible. OB and medical history updated. Plan of care discussed. Pt voices understanding.

## 2022-11-14 NOTE — FLOWSHEET NOTE
Discharge instructions reviewed. Reviewed daily fetal kick counts and information sheet provided. Instructed to follow up with OB provider this week. Pt voices understanding.

## 2022-11-14 NOTE — FLOWSHEET NOTE
Telephone report to [address]. informed of EDC, G-1, pt presents with concerns of no fetal movement, reassuring fetal heart rate with positive fetal movement. No ctx noted. Order received to discharge home and have pt follow up with her OB provider.

## 2022-11-14 NOTE — FLOWSHEET NOTE
Pt presents to birthing center with complaints of no fetal movement today. To LT02. Instructed to change into gown and obtain CCMSUA.

## 2023-01-29 ENCOUNTER — HOSPITAL ENCOUNTER (OUTPATIENT)
Age: 23
Discharge: HOME | End: 2023-01-30
Attending: OBSTETRICS & GYNECOLOGY | Admitting: OBSTETRICS & GYNECOLOGY
Payer: COMMERCIAL

## 2023-01-29 LAB
AMPHETAMINES: NEGATIVE
BARBITURATE SCREEN URINE: NEGATIVE
BENZODIAZEPINE SCREEN, URINE: NEGATIVE
BILIRUBIN URINE: ABNORMAL MG/DL
BLOOD, URINE: ABNORMAL
CANNABINOID SCREEN URINE: NEGATIVE
CLARITY: CLEAR
COCAINE METABOLITE: NEGATIVE
COLOR: YELLOW
GLUCOSE, URINE: NEGATIVE MG/DL
KETONES, URINE: >80 MG/DL
LEUKOCYTE ESTERASE, URINE: ABNORMAL
NITRITE URINE, QUANTITATIVE: NEGATIVE
OPIATES, URINE: NEGATIVE
OXYCODONE: NEGATIVE
PH, URINE: 6 (ref 5–8)
PHENCYCLIDINE, URINE: NEGATIVE
PROTEIN UA: 100 MG/DL
SPECIFIC GRAVITY UA: 1.02 (ref 1–1.03)
UROBILINOGEN, URINE: 2 MG/DL (ref 0.2–1)

## 2023-01-29 PROCEDURE — 80307 DRUG TEST PRSMV CHEM ANLYZR: CPT

## 2023-01-29 PROCEDURE — 81001 URINALYSIS AUTO W/SCOPE: CPT

## 2023-01-29 RX ORDER — FERROUS SULFATE 325(65) MG
325 TABLET ORAL EVERY OTHER DAY
Status: ON HOLD | COMMUNITY
End: 2023-02-04 | Stop reason: HOSPADM

## 2023-01-29 RX ORDER — ACETAMINOPHEN 325 MG/1
650 TABLET ORAL EVERY 4 HOURS PRN
Status: DISCONTINUED | OUTPATIENT
Start: 2023-01-29 | End: 2023-01-30 | Stop reason: HOSPADM

## 2023-01-30 VITALS
WEIGHT: 205 LBS | HEIGHT: 65 IN | DIASTOLIC BLOOD PRESSURE: 74 MMHG | BODY MASS INDEX: 34.16 KG/M2 | RESPIRATION RATE: 18 BRPM | SYSTOLIC BLOOD PRESSURE: 124 MMHG | TEMPERATURE: 99.7 F | HEART RATE: 113 BPM

## 2023-01-30 LAB
BACTERIA: ABNORMAL /HPF
MUCUS: ABNORMAL HPF
NON SQUAM EPI CELLS: 1 /HPF
RBC URINE: 44 /HPF (ref 0–6)
SQUAMOUS EPITHELIAL: 2 /HPF
TRICHOMONAS: ABNORMAL /HPF
WBC CLUMP: ABNORMAL /HPF
WBC UA: 161 /HPF (ref 0–5)

## 2023-01-30 PROCEDURE — 99213 OFFICE O/P EST LOW 20 MIN: CPT

## 2023-01-30 NOTE — FLOWSHEET NOTE
Patient presented to [facility] with report of contractions for the last several hours. Denies bleeding or leaking fluid, fetal movement  as usual.     Patient reports intercourse earlier today. Her abdomen is soft/non-tender. SVE as noted, urine collected and sent. Urine specimen appears very dark, water provided to drink.

## 2023-01-30 NOTE — FLOWSHEET NOTE
HANDY to Dr. Ingrid Stewart with patient complaint, exam and lab result. Order was received to discharge home. Patient verbalized understanding of follow up plan including her scheduled appointment today. Left department in wheelchair with her family.

## 2023-01-30 NOTE — DISCHARGE INSTRUCTIONS
LABOR    Call your doctor if you have these signs:     Regular tightening of the uterus coming as often as once every 15 minutes     Menstrual-like cramps, they may be regular, or may be continuous and move to   your back. A low, dull backache different from what you normally experience. It may come and go. Vaginal leaking of fluid. Any bleeding from your vagina. Pain, burning or bleeding when you urinate. LABOR    Call your doctor, or come to the hospital, if you have:    Contractions coming about every 3-5 minutes apart, for about one hour. Labor contractions are usually strong enough that you can not walk or talk while having the contractions. ( Contractions can feel like menstrual cramps, tightening in your abdomen, rectal pressure or a backache. This feeling comes and goes.)    Vaginal leaking of fluid. Heavy, bright red bleeding, like the days of your period. ( A little bloody show or spotting is normal in labor.)    ALWAYS CALL YOUR DOCTOR IF YOU:    Notice decreased movement of your baby, different from what you are used to. Have heavy, bright red bleeding, like the heavy days of your periods. Have vaginal leaking of fluid. Keep your next appointment as scheduled    Activity as tolerated    Diet as tolerated. Increase your water intake.

## 2023-02-02 ENCOUNTER — ANESTHESIA EVENT (OUTPATIENT)
Dept: LABOR AND DELIVERY | Age: 23
End: 2023-02-02
Payer: COMMERCIAL

## 2023-02-02 ENCOUNTER — HOSPITAL ENCOUNTER (INPATIENT)
Age: 23
LOS: 2 days | Discharge: HOME | DRG: 566 | End: 2023-02-04
Attending: OBSTETRICS & GYNECOLOGY | Admitting: OBSTETRICS & GYNECOLOGY
Payer: COMMERCIAL

## 2023-02-02 ENCOUNTER — ANESTHESIA (OUTPATIENT)
Dept: LABOR AND DELIVERY | Age: 23
End: 2023-02-02
Payer: COMMERCIAL

## 2023-02-02 LAB
ABO/RH: NORMAL
AMPHETAMINES: NEGATIVE
ANTIBODY SCREEN: NEGATIVE
BACTERIA: NEGATIVE /HPF
BARBITURATE SCREEN URINE: NEGATIVE
BENZODIAZEPINE SCREEN, URINE: NEGATIVE
BILIRUBIN URINE: ABNORMAL MG/DL
BLOOD, URINE: ABNORMAL
CANNABINOID SCREEN URINE: NEGATIVE
CLARITY: CLEAR
COCAINE METABOLITE: NEGATIVE
COLOR: YELLOW
GLUCOSE, URINE: NEGATIVE MG/DL
HCT VFR BLD CALC: 31.6 % (ref 37–47)
HEMOGLOBIN: 10.5 GM/DL (ref 12.5–16)
KETONES, URINE: >80 MG/DL
LEUKOCYTE ESTERASE, URINE: ABNORMAL
MCH RBC QN AUTO: 30.3 PG (ref 27–31)
MCHC RBC AUTO-ENTMCNC: 33.2 % (ref 32–36)
MCV RBC AUTO: 91.1 FL (ref 78–100)
MUCUS: ABNORMAL HPF
NITRITE URINE, QUANTITATIVE: NEGATIVE
OPIATES, URINE: NEGATIVE
OXYCODONE: NEGATIVE
PDW BLD-RTO: 13.1 % (ref 11.7–14.9)
PH, URINE: 6 (ref 5–8)
PHENCYCLIDINE, URINE: NEGATIVE
PLATELET # BLD: 243 K/CU MM (ref 140–440)
PMV BLD AUTO: 10.4 FL (ref 7.5–11.1)
PROTEIN UA: 30 MG/DL
RBC # BLD: 3.47 M/CU MM (ref 4.2–5.4)
RBC URINE: 2 /HPF (ref 0–6)
SPECIFIC GRAVITY UA: >1.03 (ref 1–1.03)
SQUAMOUS EPITHELIAL: 9 /HPF
TRICHOMONAS: ABNORMAL /HPF
UROBILINOGEN, URINE: 1 MG/DL (ref 0.2–1)
WBC # BLD: 14.7 K/CU MM (ref 4–10.5)
WBC CLUMP: ABNORMAL /HPF
WBC UA: 83 /HPF (ref 0–5)

## 2023-02-02 PROCEDURE — 1220000000 HC SEMI PRIVATE OB R&B

## 2023-02-02 PROCEDURE — 86901 BLOOD TYPING SEROLOGIC RH(D): CPT

## 2023-02-02 PROCEDURE — 2500000003 HC RX 250 WO HCPCS: Performed by: NURSE ANESTHETIST, CERTIFIED REGISTERED

## 2023-02-02 PROCEDURE — 3700000025 EPIDURAL BLOCK: Performed by: ANESTHESIOLOGY

## 2023-02-02 PROCEDURE — 81001 URINALYSIS AUTO W/SCOPE: CPT

## 2023-02-02 PROCEDURE — 87186 SC STD MICRODIL/AGAR DIL: CPT

## 2023-02-02 PROCEDURE — 80307 DRUG TEST PRSMV CHEM ANLYZR: CPT

## 2023-02-02 PROCEDURE — 6370000000 HC RX 637 (ALT 250 FOR IP): Performed by: OBSTETRICS & GYNECOLOGY

## 2023-02-02 PROCEDURE — 86900 BLOOD TYPING SEROLOGIC ABO: CPT

## 2023-02-02 PROCEDURE — 6360000002 HC RX W HCPCS: Performed by: NURSE ANESTHETIST, CERTIFIED REGISTERED

## 2023-02-02 PROCEDURE — 0KQM0ZZ REPAIR PERINEUM MUSCLE, OPEN APPROACH: ICD-10-PCS | Performed by: OBSTETRICS & GYNECOLOGY

## 2023-02-02 PROCEDURE — 86850 RBC ANTIBODY SCREEN: CPT

## 2023-02-02 PROCEDURE — 87077 CULTURE AEROBIC IDENTIFY: CPT

## 2023-02-02 PROCEDURE — 2580000003 HC RX 258: Performed by: OBSTETRICS & GYNECOLOGY

## 2023-02-02 PROCEDURE — 85027 COMPLETE CBC AUTOMATED: CPT

## 2023-02-02 PROCEDURE — 7200000001 HC VAGINAL DELIVERY

## 2023-02-02 PROCEDURE — 6360000002 HC RX W HCPCS: Performed by: OBSTETRICS & GYNECOLOGY

## 2023-02-02 PROCEDURE — 87086 URINE CULTURE/COLONY COUNT: CPT

## 2023-02-02 RX ORDER — DOCUSATE SODIUM 100 MG/1
100 CAPSULE, LIQUID FILLED ORAL 2 TIMES DAILY
Status: DISCONTINUED | OUTPATIENT
Start: 2023-02-02 | End: 2023-02-02 | Stop reason: HOSPADM

## 2023-02-02 RX ORDER — FAMOTIDINE 10 MG/ML
20 INJECTION, SOLUTION INTRAVENOUS 2 TIMES DAILY PRN
Status: DISCONTINUED | OUTPATIENT
Start: 2023-02-02 | End: 2023-02-02 | Stop reason: HOSPADM

## 2023-02-02 RX ORDER — FENTANYL CITRATE 50 UG/ML
100 INJECTION, SOLUTION INTRAMUSCULAR; INTRAVENOUS
Status: DISCONTINUED | OUTPATIENT
Start: 2023-02-02 | End: 2023-02-02 | Stop reason: HOSPADM

## 2023-02-02 RX ORDER — OXYCODONE HYDROCHLORIDE 5 MG/1
5 TABLET ORAL EVERY 4 HOURS PRN
Status: DISCONTINUED | OUTPATIENT
Start: 2023-02-02 | End: 2023-02-04 | Stop reason: HOSPADM

## 2023-02-02 RX ORDER — SODIUM CHLORIDE, SODIUM LACTATE, POTASSIUM CHLORIDE, AND CALCIUM CHLORIDE .6; .31; .03; .02 G/100ML; G/100ML; G/100ML; G/100ML
1000 INJECTION, SOLUTION INTRAVENOUS PRN
Status: DISCONTINUED | OUTPATIENT
Start: 2023-02-02 | End: 2023-02-04 | Stop reason: HOSPADM

## 2023-02-02 RX ORDER — SODIUM CHLORIDE, SODIUM LACTATE, POTASSIUM CHLORIDE, CALCIUM CHLORIDE 600; 310; 30; 20 MG/100ML; MG/100ML; MG/100ML; MG/100ML
INJECTION, SOLUTION INTRAVENOUS CONTINUOUS
Status: DISCONTINUED | OUTPATIENT
Start: 2023-02-02 | End: 2023-02-04 | Stop reason: HOSPADM

## 2023-02-02 RX ORDER — LIDOCAINE HYDROCHLORIDE AND EPINEPHRINE 20; 5 MG/ML; UG/ML
INJECTION, SOLUTION EPIDURAL; INFILTRATION; INTRACAUDAL; PERINEURAL PRN
Status: DISCONTINUED | OUTPATIENT
Start: 2023-02-02 | End: 2023-02-02 | Stop reason: SDUPTHER

## 2023-02-02 RX ORDER — TRANEXAMIC ACID 10 MG/ML
1000 INJECTION, SOLUTION INTRAVENOUS
Status: ACTIVE | OUTPATIENT
Start: 2023-02-02 | End: 2023-02-03

## 2023-02-02 RX ORDER — ROPIVACAINE HYDROCHLORIDE 2 MG/ML
10 INJECTION, SOLUTION EPIDURAL; INFILTRATION; PERINEURAL CONTINUOUS
Status: DISCONTINUED | OUTPATIENT
Start: 2023-02-02 | End: 2023-02-04 | Stop reason: HOSPADM

## 2023-02-02 RX ORDER — ONDANSETRON 4 MG/1
8 TABLET, ORALLY DISINTEGRATING ORAL EVERY 8 HOURS PRN
Status: DISCONTINUED | OUTPATIENT
Start: 2023-02-02 | End: 2023-02-04 | Stop reason: HOSPADM

## 2023-02-02 RX ORDER — SODIUM CHLORIDE 9 MG/ML
INJECTION, SOLUTION INTRAVENOUS PRN
Status: DISCONTINUED | OUTPATIENT
Start: 2023-02-02 | End: 2023-02-04 | Stop reason: HOSPADM

## 2023-02-02 RX ORDER — MISOPROSTOL 200 UG/1
800 TABLET ORAL PRN
Status: DISCONTINUED | OUTPATIENT
Start: 2023-02-02 | End: 2023-02-04 | Stop reason: HOSPADM

## 2023-02-02 RX ORDER — OXYCODONE HYDROCHLORIDE 5 MG/1
10 TABLET ORAL EVERY 4 HOURS PRN
Status: DISCONTINUED | OUTPATIENT
Start: 2023-02-02 | End: 2023-02-04 | Stop reason: HOSPADM

## 2023-02-02 RX ORDER — FERROUS SULFATE 325(65) MG
325 TABLET ORAL 2 TIMES DAILY WITH MEALS
Status: DISCONTINUED | OUTPATIENT
Start: 2023-02-02 | End: 2023-02-04 | Stop reason: HOSPADM

## 2023-02-02 RX ORDER — METHYLERGONOVINE MALEATE 0.2 MG/ML
200 INJECTION INTRAVENOUS PRN
Status: DISCONTINUED | OUTPATIENT
Start: 2023-02-02 | End: 2023-02-02 | Stop reason: SDUPTHER

## 2023-02-02 RX ORDER — SIMETHICONE 80 MG
80 TABLET,CHEWABLE ORAL EVERY 6 HOURS PRN
Status: DISCONTINUED | OUTPATIENT
Start: 2023-02-02 | End: 2023-02-04 | Stop reason: HOSPADM

## 2023-02-02 RX ORDER — IBUPROFEN 800 MG/1
800 TABLET ORAL EVERY 8 HOURS SCHEDULED
Status: DISCONTINUED | OUTPATIENT
Start: 2023-02-02 | End: 2023-02-04 | Stop reason: HOSPADM

## 2023-02-02 RX ORDER — SODIUM CHLORIDE 0.9 % (FLUSH) 0.9 %
5-40 SYRINGE (ML) INJECTION PRN
Status: DISCONTINUED | OUTPATIENT
Start: 2023-02-02 | End: 2023-02-04 | Stop reason: HOSPADM

## 2023-02-02 RX ORDER — ONDANSETRON 4 MG/1
4 TABLET, ORALLY DISINTEGRATING ORAL ONCE
Status: COMPLETED | OUTPATIENT
Start: 2023-02-02 | End: 2023-02-02

## 2023-02-02 RX ORDER — MISOPROSTOL 200 UG/1
800 TABLET ORAL PRN
Status: DISCONTINUED | OUTPATIENT
Start: 2023-02-02 | End: 2023-02-02 | Stop reason: SDUPTHER

## 2023-02-02 RX ORDER — CARBOPROST TROMETHAMINE 250 UG/ML
250 INJECTION, SOLUTION INTRAMUSCULAR PRN
Status: DISCONTINUED | OUTPATIENT
Start: 2023-02-02 | End: 2023-02-04 | Stop reason: HOSPADM

## 2023-02-02 RX ORDER — DOCUSATE SODIUM 100 MG/1
100 CAPSULE, LIQUID FILLED ORAL 2 TIMES DAILY
Status: DISCONTINUED | OUTPATIENT
Start: 2023-02-02 | End: 2023-02-04 | Stop reason: HOSPADM

## 2023-02-02 RX ORDER — LANOLIN 100 %
OINTMENT (GRAM) TOPICAL PRN
Status: DISCONTINUED | OUTPATIENT
Start: 2023-02-02 | End: 2023-02-04 | Stop reason: HOSPADM

## 2023-02-02 RX ORDER — SODIUM CHLORIDE, SODIUM LACTATE, POTASSIUM CHLORIDE, AND CALCIUM CHLORIDE .6; .31; .03; .02 G/100ML; G/100ML; G/100ML; G/100ML
500 INJECTION, SOLUTION INTRAVENOUS PRN
Status: DISCONTINUED | OUTPATIENT
Start: 2023-02-02 | End: 2023-02-04 | Stop reason: HOSPADM

## 2023-02-02 RX ORDER — NALOXONE HYDROCHLORIDE 0.4 MG/ML
INJECTION, SOLUTION INTRAMUSCULAR; INTRAVENOUS; SUBCUTANEOUS PRN
Status: DISCONTINUED | OUTPATIENT
Start: 2023-02-02 | End: 2023-02-02 | Stop reason: HOSPADM

## 2023-02-02 RX ORDER — METHYLERGONOVINE MALEATE 0.2 MG/ML
200 INJECTION INTRAVENOUS PRN
Status: DISCONTINUED | OUTPATIENT
Start: 2023-02-02 | End: 2023-02-04 | Stop reason: HOSPADM

## 2023-02-02 RX ORDER — BISACODYL 10 MG
10 SUPPOSITORY, RECTAL RECTAL DAILY PRN
Status: DISCONTINUED | OUTPATIENT
Start: 2023-02-02 | End: 2023-02-04 | Stop reason: HOSPADM

## 2023-02-02 RX ORDER — ONDANSETRON 2 MG/ML
4 INJECTION INTRAMUSCULAR; INTRAVENOUS EVERY 6 HOURS PRN
Status: DISCONTINUED | OUTPATIENT
Start: 2023-02-02 | End: 2023-02-02 | Stop reason: HOSPADM

## 2023-02-02 RX ORDER — SODIUM CHLORIDE 0.9 % (FLUSH) 0.9 %
5-40 SYRINGE (ML) INJECTION EVERY 12 HOURS SCHEDULED
Status: DISCONTINUED | OUTPATIENT
Start: 2023-02-02 | End: 2023-02-04 | Stop reason: HOSPADM

## 2023-02-02 RX ORDER — LIDOCAINE HYDROCHLORIDE 10 MG/ML
30 INJECTION, SOLUTION EPIDURAL; INFILTRATION; INTRACAUDAL; PERINEURAL PRN
Status: DISCONTINUED | OUTPATIENT
Start: 2023-02-02 | End: 2023-02-04 | Stop reason: HOSPADM

## 2023-02-02 RX ADMIN — Medication 166.7 ML: at 10:22

## 2023-02-02 RX ADMIN — Medication 1 MILLI-UNITS/MIN: at 09:45

## 2023-02-02 RX ADMIN — LIDOCAINE HYDROCHLORIDE AND EPINEPHRINE 5 ML: 20; 5 INJECTION, SOLUTION EPIDURAL; INFILTRATION; INTRACAUDAL; PERINEURAL at 07:29

## 2023-02-02 RX ADMIN — ROPIVACAINE HYDROCHLORIDE 10 ML/HR: 2 INJECTION, SOLUTION EPIDURAL; INFILTRATION at 07:31

## 2023-02-02 RX ADMIN — SODIUM CHLORIDE, POTASSIUM CHLORIDE, SODIUM LACTATE AND CALCIUM CHLORIDE 1000 ML: 600; 310; 30; 20 INJECTION, SOLUTION INTRAVENOUS at 06:10

## 2023-02-02 RX ADMIN — ROPIVACAINE HYDROCHLORIDE 8 ML: 2 INJECTION, SOLUTION EPIDURAL; INFILTRATION at 07:30

## 2023-02-02 RX ADMIN — SODIUM CHLORIDE, POTASSIUM CHLORIDE, SODIUM LACTATE AND CALCIUM CHLORIDE: 600; 310; 30; 20 INJECTION, SOLUTION INTRAVENOUS at 07:59

## 2023-02-02 RX ADMIN — Medication: at 16:26

## 2023-02-02 RX ADMIN — ONDANSETRON 4 MG: 4 TABLET, ORALLY DISINTEGRATING ORAL at 05:21

## 2023-02-02 RX ADMIN — METHYLERGONOVINE MALEATE 200 MCG: 0.2 INJECTION, SOLUTION INTRAMUSCULAR; INTRAVENOUS at 10:28

## 2023-02-02 RX ADMIN — IBUPROFEN 800 MG: 800 TABLET, FILM COATED ORAL at 20:28

## 2023-02-02 RX ADMIN — ONDANSETRON 4 MG: 2 INJECTION INTRAMUSCULAR; INTRAVENOUS at 09:28

## 2023-02-02 RX ADMIN — DOCUSATE SODIUM 100 MG: 100 CAPSULE, LIQUID FILLED ORAL at 20:30

## 2023-02-02 RX ADMIN — IBUPROFEN 800 MG: 800 TABLET, FILM COATED ORAL at 12:08

## 2023-02-02 ASSESSMENT — PAIN SCALES - GENERAL
PAINLEVEL_OUTOF10: 4
PAINLEVEL_OUTOF10: 0
PAINLEVEL_OUTOF10: 4
PAINLEVEL_OUTOF10: 10
PAINLEVEL_OUTOF10: 0

## 2023-02-02 ASSESSMENT — PAIN DESCRIPTION - ORIENTATION
ORIENTATION: LEFT
ORIENTATION: LOWER
ORIENTATION: LOWER

## 2023-02-02 ASSESSMENT — PAIN - FUNCTIONAL ASSESSMENT
PAIN_FUNCTIONAL_ASSESSMENT: ACTIVITIES ARE NOT PREVENTED
PAIN_FUNCTIONAL_ASSESSMENT: PREVENTS OR INTERFERES SOME ACTIVE ACTIVITIES AND ADLS
PAIN_FUNCTIONAL_ASSESSMENT: ACTIVITIES ARE NOT PREVENTED

## 2023-02-02 ASSESSMENT — PAIN DESCRIPTION - LOCATION
LOCATION: ABDOMEN;BACK
LOCATION: ABDOMEN
LOCATION: BACK

## 2023-02-02 ASSESSMENT — PAIN DESCRIPTION - DESCRIPTORS
DESCRIPTORS: CRAMPING
DESCRIPTORS: CRAMPING
DESCRIPTORS: ACHING

## 2023-02-02 ASSESSMENT — PAIN DESCRIPTION - FREQUENCY: FREQUENCY: INTERMITTENT

## 2023-02-02 ASSESSMENT — PAIN DESCRIPTION - ONSET: ONSET: GRADUAL

## 2023-02-02 ASSESSMENT — PAIN DESCRIPTION - PAIN TYPE: TYPE: ACUTE PAIN

## 2023-02-02 NOTE — H&P
Department of Obstetrics and Gynecology   Obstetrics History and Physical        CHIEF COMPLAINT:  contractions    HISTORY OF PRESENT ILLNESS:      The patient is a 23 y.o. female at 38w7d. OB History          1    Para        Term                AB        Living             SAB        IAB        Ectopic        Molar        Multiple        Live Births                Patient presents with a chief complaint as above and is being admitted for early latent labor    Estimated Due Date: Estimated Date of Delivery: 2/10/23    PRENATAL CARE:    Complicated by: none    PAST OB HISTORY  OB History          1    Para        Term                AB        Living             SAB        IAB        Ectopic        Molar        Multiple        Live Births                    Past Medical History:        Diagnosis Date    ADHD (attention deficit hyperactivity disorder)     Anxiety     Asthma     Depression     Scoliosis      Past Surgical History:    History reviewed. No pertinent surgical history.   Allergies:  Bee venom  Social History:    Social History     Socioeconomic History    Marital status: Single     Spouse name: Not on file    Number of children: Not on file    Years of education: Not on file    Highest education level: Not on file   Occupational History    Not on file   Tobacco Use    Smoking status: Former     Packs/day: 0.50     Types: Cigarettes    Smokeless tobacco: Never   Vaping Use    Vaping Use: Some days   Substance and Sexual Activity    Alcohol use: Not Currently     Comment: occ    Drug use: No    Sexual activity: Yes     Partners: Male   Other Topics Concern    Not on file   Social History Narrative    Not on file     Social Determinants of Health     Financial Resource Strain: Not on file   Food Insecurity: Not on file   Transportation Needs: Not on file   Physical Activity: Not on file   Stress: Not on file   Social Connections: Not on file   Intimate Partner Violence: Not on file Housing Stability: Not on file     Family History:   History reviewed. No pertinent family history. Medications Prior to Admission:  Medications Prior to Admission: ferrous sulfate (IRON 325) 325 (65 Fe) MG tablet, Take 325 mg by mouth every other day  acetaminophen (TYLENOL) 325 MG tablet, Take 2 tablets by mouth every 6 hours as needed for Pain (Patient not taking: No sig reported)  ondansetron (ZOFRAN ODT) 4 MG disintegrating tablet, Take 1 tablet by mouth every 8 hours as needed for Nausea    REVIEW OF SYSTEMS:    CONSTITUTIONAL:  negative  RESPIRATORY:  negative  CARDIOVASCULAR:  negative  GASTROINTESTINAL:  negative  ALLERGIC/IMMUNOLOGIC:  negative  NEUROLOGICAL:  negative  BEHAVIOR/PSYCH:  negative    PHYSICAL EXAM:  Blood pressure 130/81, pulse (!) 112, temperature 97.8 °F (36.6 °C), temperature source Oral, resp. rate 18, last menstrual period 01/02/2022, SpO2 100 %, not currently breastfeeding. General appearance:  awake, alert, cooperative, no apparent distress, and appears stated age  Neurologic:  Awake, alert, oriented to name, place and time.     Lungs:  No increased work of breathing, good air exchange  Abdomen:  Soft, non tender, gravid, consistent with her gestational age  Fetal heart rate:    Baseline Heart Rate:   Category 1 tracing     Pelvis:  Adequate pelvis  Cervix: 2cm on admission, now complete with srom 0815    Contraction frequency:  2 minutes    Membranes:  Ruptured meconium stained    Labs:  CBC:   Lab Results   Component Value Date/Time    WBC 14.7 02/02/2023 06:10 AM    RBC 3.47 02/02/2023 06:10 AM    HGB 10.5 02/02/2023 06:10 AM    HCT 31.6 02/02/2023 06:10 AM    MCV 91.1 02/02/2023 06:10 AM    RDW 13.1 02/02/2023 06:10 AM     02/02/2023 06:10 AM         ASSESSMENT AND PLAN:    Labor: Admit, anticipate normal delivery, routine labor orders  Fetus: Reassuring  GBS: No  Other: IV hydration and antiemetics

## 2023-02-02 NOTE — ANESTHESIA POSTPROCEDURE EVALUATION
Department of Anesthesiology  Postprocedure Note    Patient: Esequiel Smith  MRN: [id number]  YOB: 2000  Date of evaluation: 2/2/2023      Procedure Summary     Date: 02/02/23 Room / Location:     Anesthesia Start: 0718 Anesthesia Stop: 5830    Procedure: Labor Analgesia Diagnosis:     Scheduled Providers:  Responsible Provider: Radha Collins MD    Anesthesia Type: epidural ASA Status: 2          Anesthesia Type: No value filed.     Bran Phase I: Bran Score: 10    Bran Phase II: Bran Score: 10      Anesthesia Post Evaluation    Patient location during evaluation: bedside  Patient participation: complete - patient participated  Level of consciousness: awake and alert  Pain score: 2  Airway patency: patent  Nausea & Vomiting: no nausea and no vomiting  Complications: no  Cardiovascular status: hemodynamically stable  Respiratory status: acceptable  Hydration status: euvolemic  Multimodal analgesia pain management approach

## 2023-02-02 NOTE — FLOWSHEET NOTE
Two severe range BP, Dr. Marco Casillas notified. Continue to monitor.         02/02/23 1044 02/02/23 1049   Maternal Vitals (MEW-T)   Temp 98.4 °F (36.9 °C)  --    Temp Source Oral  --    Heart Rate (!) 113 (!) 112   Heart Rate Source Monitor;Brachial  --    Resp 16  --    BP (!) 166/64 (!) 160/70   BP Method Automatic  --

## 2023-02-02 NOTE — PROGRESS NOTES
Patient tx to MB 18 via wheelchair with all belongings. Oriented to room and call light. Bedside handoff with Francoise Rodriguez. See flowsheet.

## 2023-02-02 NOTE — FLOWSHEET NOTE
TR to Dr. Ingrid Randhawa regarding last SVE results per JUDITH Woodward RN and patient's continued c/o pain. Order received to admit.

## 2023-02-02 NOTE — PROGRESS NOTES
Patient c/o pain and requesting epidural. Fluid bolus given per order. CRNA to bedside. Informed consent confirmed, time out complete. 0248 CRNA in   0664 350 84 34 catheter in   0729 test dose given  0735 bolus dose given     Positioned with left tilt. Patient tolerated procedure well, reporting immediate pain relief.

## 2023-02-02 NOTE — PLAN OF CARE
Problem: Vaginal Birth or  Section  Goal: Fetal and maternal status remain reassuring during the birth process  Description:  Birth OB-Pregnancy care plan goal which identifies if the fetal and maternal status remain reassuring during the birth process  Outcome: Progressing     Problem: Postpartum  Goal: Experiences normal postpartum course  Description:  Postpartum OB-Pregnancy care plan goal which identifies if the mother is experiencing a normal postpartum course  Outcome: Progressing  Goal: Appropriate maternal -  bonding  Description:  Postpartum OB-Pregnancy care plan goal which identifies if the mother and  are bonding appropriately  Outcome: Progressing  Goal: Establishment of infant feeding pattern  Description:  Postpartum OB-Pregnancy care plan goal which identifies if the mother is establishing a feeding pattern with their   Outcome: Progressing  Goal: Incisions, wounds, or drain sites healing without S/S of infection  Outcome: Progressing     Problem: Pain  Goal: Verbalizes/displays adequate comfort level or baseline comfort level  Outcome: Progressing     Problem: Infection - Adult  Goal: Absence of infection at discharge  Outcome: Progressing  Goal: Absence of infection during hospitalization  Outcome: Progressing  Goal: Absence of fever/infection during anticipated neutropenic period  Outcome: Progressing     Problem: Safety - Adult  Goal: Free from fall injury  Outcome: Progressing

## 2023-02-02 NOTE — L&D DELIVERY NOTE
Mother's Information      Labor Events     Labor?: No  Cervical Ripening:   Now               Ji Chong Pending [facility] [<[id number]>]      Labor Events     Labor?: No  Cervical Ripening Date/Time:       Rupture Date/Time: 23 08:15:00   Rupture Type: SROM, Intact  Fluid Color: Meconium  Fluid Odor: Foul       Anesthesia           Start Pushing      Labor onset date/time:   Now     Dilation complete date/time:   Now     Start pushing date/time:    Decision date/time (emergent ):           Delivery ()      Delivery Date/Time:      Delivery Type: Vaginal, Spontaneous    Details:             Presentation    Presentation: Vertex  _: Occiput  _: Anterior       Shoulder Dystocia    Add Second Maneuver  Add Third Maneuver  Add Fourth Maneuver  Add Fifth Maneuver  Add Sixth Maneuver  Add Seventh Maneuver  Add Eighth Maneuver  Add Ninth Maneuver       Assisted Delivery Details    Forceps Attempted?: No  Vacuum Extractor Attempted?: No       Document Additional Attempt         Document Additional Attempt                 Cord    Vessels: 3 Vessels  Complications: None       Placenta           Lacerations    Episiotomy: None  Perineal Lacerations: 2nd  Other Lacerations: labial laceration, periurethral laceration  Periurethral Laceration: Bilateral, Left    Number of Repair Packets: 1       Vaginal Counts        Sponges Needles Instruments   Initial Counts      Final Counts      If the count is incorrect due to Intentionally Retained Foreign Object (IRFO) add the IRFO LDA in Lines/Drains.   Add LDA: Link to [facility]       Blood Loss  Mother: Kelby Siemens #[id number]     Start of Mother's Information      Delivery Blood Loss  23 2245 - 23 1045      None                 End of Mother's Information  Mother: Kelby Siemens #[id number]                Delivery Providers    Delivering clinician: Van Mason MD     Provider Role     Obstetrician Primary Nurse     Primary [place] Nurse     NICU Nurse     Neonatologist     Anesthesiologist     Nurse Anesthetist     Nurse Practitioner     Midwife     Nursery Nurse               Assessment       Apgar Scoring Key:    0 1 2    Skin Color: Blue or pale Acrocyanotic Completely pink    Heart Rate: Absent <100 bpm >100 bpm    Reflex Irritability: No response Grimace Cry or active withdrawal    Muscle Tone: Limp Some flexion Active motion    Respiratory Effort: Absent Weak cry; hypoventilation Good, crying                      Skin Color:   Heart Rate:   Reflex Irritability:   Muscle Tone:   Respiratory Effort: Total:            1 Minute:        Apgar 1 total from OB History    5 Minute:        Apgar 5 total from OB History    10 Minute:              15 Minute:              20 Minute:                                 Resuscitation                Measurements               Title      Skin to Skin Initiation Date/Time:       Skin to Skin End Date/Time:                  Department of Obstetrics and Gynecology  Spontaneous Vaginal Delivery Note         Pre-operative Diagnosis:  Term pregnancy, Spontaneous labor, and Uncomplicated pregnancy    Post-operative Diagnosis:  Same + live male     Procedure:  Spontaneous vaginal delivery    Surgeon:  Ralph Ferguson MD    Information for the patient's :  Ricardo Garcia [<[id number]>]        Anesthesia:  Epidural    Estimated blood loss:  400ml    Specimen:  Placenta not sent to pathology     Cord blood sent Yes    Complications:  uterine inertia managed by bimanual compression, IV pitocin and IM Methergine x 1    Condition:  patient and infant stable    GBS negative      Details of Procedure:    The patient is a 23 y.o. female at 38w7d   OB History          1    Para        Term                AB        Living             SAB        IAB        Ectopic        Molar        Multiple        Live Births                 who was admitted for early latent labor. She received the following interventions: IV Pitocin augmentation in second stage of labor only  The patient progressed well,did receive an epidural, became complete and started to push. After pushing for 1 hour, the fetal head was at the perineum, nose and mouth suctioned with bulb suction and the rest of the infant delivered atraumatically, placed on mother abdomen. Cord was clamped and cut and infant placed in kangaroo care. The delivery of the placenta was spontaneous. The perineum and vagina were explored and a bilateral periurethral - not repaired,  second degree laceration and left labial was repaired in standard fashion.  .      Electronically signed by Terrace Skiff, MD on 2/2/2023 at 10:45 AM

## 2023-02-02 NOTE — FLOWSHEET NOTE
Patient presents to [facility] with c/o contraction pain. Oriented to LT-05 and call light system. Instructed to change into gown and in need of CCUA. Patient voiced understanding.

## 2023-02-02 NOTE — ANESTHESIA PRE PROCEDURE
Department of Anesthesiology  Preprocedure Note       Name:  Lonnie Shukla   Age:  23 y. o.  :  2000                                          MRN:  [id number]         Date:  2023      Surgeon: * No surgeons listed *    Procedure: * No procedures listed *    Medications prior to admission:   Prior to Admission medications    Medication Sig Start Date End Date Taking?  Authorizing Provider   ferrous sulfate (IRON 325) 325 (65 Fe) MG tablet Take 325 mg by mouth every other day    Historical Provider, MD   acetaminophen (TYLENOL) 325 MG tablet Take 2 tablets by mouth every 6 hours as needed for Pain  Patient not taking: No sig reported 22   Tresa Butler DO   ondansetron (ZOFRAN ODT) 4 MG disintegrating tablet Take 1 tablet by mouth every 8 hours as needed for Nausea 22   Tresa Butler DO       Current medications:    Current Facility-Administered Medications   Medication Dose Route Frequency Provider Last Rate Last Admin    lactated ringers IV soln infusion   IntraVENous Continuous Naya Al MD        lactated ringers bolus  500 mL IntraVENous PRN Naya Al MD        Or    lactated ringers bolus  1,000 mL IntraVENous PRN Naya Al MD        methylergonovine (METHERGINE) injection 200 mcg  200 mcg IntraMUSCular PRRICCARDO Al MD        carboprost (HEMABATE) injection 250 mcg  250 mcg IntraMUSCular PRRICCARDO Al MD        miSOPROStol (CYTOTEC) tablet 800 mcg  800 mcg Rectal PRRICCARDO Al MD        tranexamic acid-NaCl IVPB premix 1,000 mg  1,000 mg IntraVENous Once PRN Naya Al MD        oxytocin (PITOCIN) 30 units in 500 mL infusion  87.3 meenu-units/min IntraVENous Continuous PRRICCARDO Al MD        And    oxytocin (PITOCIN) 10 unit bolus from the bag  10 Units IntraVENous PRRICCARDO Al MD        lidocaine PF 1 % injection 30 mL 30 mL Other PRN Sade Ambrosio MD        fentaNYL (SUBLIMAZE) injection 100 mcg  100 mcg IntraVENous Q1H PRN Sade Ambrosio MD        famotidine (PEPCID) injection 20 mg  20 mg IntraVENous BID PRN Sade Ambrosio MD        ondansetron [facility]) injection 4 mg  4 mg IntraVENous Q6H PRN Sade Ambrosio MD        docusate sodium (COLACE) capsule 100 mg  100 mg Oral BID Sade Ambrosio MD           Allergies: Allergies   Allergen Reactions    Bee Venom Anaphylaxis       Problem List:    Patient Active Problem List   Diagnosis Code    Right-sided Bell's palsy G51.0    Pregnancy as incidental finding Z33.1    Labor and delivery indication for care or intervention O75.9       Past Medical History:        Diagnosis Date    ADHD (attention deficit hyperactivity disorder)     Anxiety     Asthma     Depression        Past Surgical History:  History reviewed. No pertinent surgical history.     Social History:    Social History     Tobacco Use    Smoking status: Former     Packs/day: 0.50     Types: Cigarettes    Smokeless tobacco: Never   Substance Use Topics    Alcohol use: Not Currently     Comment: occ                                Counseling given: Not Answered      Vital Signs (Current):   Vitals:    02/02/23 0451 02/02/23 0500 02/02/23 0515   BP: 119/66     Pulse: (!) 116     Resp: 18     Temp: 37.6 °C (99.6 °F)     TempSrc: Oral     SpO2: 98% 100% (!) 83%                                              BP Readings from Last 3 Encounters:   02/02/23 119/66   01/30/23 124/74   11/13/22 (!) 115/55       NPO Status:                                                                                 BMI:   Wt Readings from Last 3 Encounters:   01/29/23 205 lb (93 kg)   11/13/22 190 lb (86.2 kg)   08/01/22 175 lb (79.4 kg)     There is no height or weight on file to calculate BMI.    CBC:   Lab Results   Component Value Date/Time    WBC 14.7 02/02/2023 06:10 AM RBC 3.47 02/02/2023 06:10 AM    HGB 10.5 02/02/2023 06:10 AM    HCT 31.6 02/02/2023 06:10 AM    MCV 91.1 02/02/2023 06:10 AM    RDW 13.1 02/02/2023 06:10 AM     02/02/2023 06:10 AM       CMP:   Lab Results   Component Value Date/Time     07/01/2022 08:50 PM    K 4.2 07/01/2022 08:50 PM     07/01/2022 08:50 PM    CO2 22 07/01/2022 08:50 PM    BUN 9 07/01/2022 08:50 PM    CREATININE 0.4 07/01/2022 08:50 PM    GFRAA >60 07/01/2022 08:50 PM    LABGLOM >60 07/01/2022 08:50 PM    GLUCOSE 79 07/01/2022 08:50 PM    PROT 7.0 07/01/2022 08:50 PM    CALCIUM 9.1 07/01/2022 08:50 PM    BILITOT 0.2 07/01/2022 08:50 PM    ALKPHOS 50 07/01/2022 08:50 PM    AST 17 07/01/2022 08:50 PM    ALT 14 07/01/2022 08:50 PM       POC Tests: No results for input(s): POCGLU, POCNA, POCK, POCCL, POCBUN, POCHEMO, POCHCT in the last 72 hours.     Coags:   Lab Results   Component Value Date/Time    PROTIME 13.0 09/22/2017 06:45 PM    INR 1.14 09/22/2017 06:45 PM       HCG (If Applicable):   Lab Results   Component Value Date    PREGTESTUR NEGATIVE 10/04/2021        ABGs: No results found for: PHART, PO2ART, BEZ5PSU, PNK0BXI, BEART, N8BVGCOV     Type & Screen (If Applicable):  No results found for: LABABO, LABRH    Drug/Infectious Status (If Applicable):  No results found for: HIV, HEPCAB    COVID-19 Screening (If Applicable):   Lab Results   Component Value Date/Time    COVID19 NOT DETECTED 10/04/2021 10:27 PM           Anesthesia Evaluation  Patient summary reviewed and Nursing notes reviewed no history of anesthetic complications:   Airway: Mallampati: II  TM distance: >3 FB   Neck ROM: full  Mouth opening: > = 3 FB   Dental: normal exam         Pulmonary:normal exam    (+) asthma:                            Cardiovascular:Negative CV ROS             Beta Blocker:  Not on Beta Blocker         Neuro/Psych:   (+) neuromuscular disease:, psychiatric history:            GI/Hepatic/Renal: Neg GI/Hepatic/Renal ROS Endo/Other: Negative Endo/Other ROS             Pt had no PAT visit       Abdominal:             Vascular: negative vascular ROS. Other Findings:           Anesthesia Plan      epidural     ASA 2             Anesthetic plan and risks discussed with patient.                         NALINI Mcneill - CRNA   2/2/2023

## 2023-02-02 NOTE — ANESTHESIA PROCEDURE NOTES
Epidural Block    Patient location during procedure: OB  Start time: 2/2/2023 7:23 AM  End time: 2/2/2023 7:30 AM  Reason for block: labor epidural  Staffing  Performed: resident/CRNA   Resident/CRNA: NALINI Mcgowan CRNA  Epidural  Patient position: sitting  Prep: ChloraPrep  Patient monitoring: continuous pulse ox and frequent blood pressure checks  Approach: midline  Location: L3-4  Injection technique: JAVAN air  Provider prep: sterile gloves and mask  Needle  Needle type: Tuohy   Needle gauge: 17 G  Needle length: 3.5 in  Needle insertion depth: 6 cm  Catheter type: side hole  Catheter size: 19 G  Catheter at skin depth: 10 cm  Test dose: negativeCatheter Secured: tegaderm and tape  Assessment  Sensory level: T8  Hemodynamics: stable  Attempts: 1  Outcomes: uncomplicated and patient tolerated procedure well  Preanesthetic Checklist  Completed: patient identified, IV checked, site marked, risks and benefits discussed, surgical/procedural consents, equipment checked, pre-op evaluation, timeout performed, anesthesia consent given, oxygen available, monitors applied/VS acknowledged, fire risk safety assessment completed and verbalized and blood product R/B/A discussed and consented

## 2023-02-03 LAB
BASOPHILS ABSOLUTE: 0 K/CU MM
BASOPHILS RELATIVE PERCENT: 0.1 % (ref 0–1)
DIFFERENTIAL TYPE: ABNORMAL
EOSINOPHILS ABSOLUTE: 0 K/CU MM
EOSINOPHILS RELATIVE PERCENT: 0.1 % (ref 0–3)
HCT VFR BLD CALC: 26.1 % (ref 37–47)
HEMOGLOBIN: 8.6 GM/DL (ref 12.5–16)
IMMATURE NEUTROPHIL %: 0.6 % (ref 0–0.43)
LYMPHOCYTES ABSOLUTE: 0.6 K/CU MM
LYMPHOCYTES RELATIVE PERCENT: 4 % (ref 24–44)
MCH RBC QN AUTO: 30.3 PG (ref 27–31)
MCHC RBC AUTO-ENTMCNC: 33 % (ref 32–36)
MCV RBC AUTO: 91.9 FL (ref 78–100)
MONOCYTES ABSOLUTE: 1.7 K/CU MM
MONOCYTES RELATIVE PERCENT: 11.8 % (ref 0–4)
NUCLEATED RBC %: 0 %
PDW BLD-RTO: 13 % (ref 11.7–14.9)
PLATELET # BLD: 203 K/CU MM (ref 140–440)
PMV BLD AUTO: 10.8 FL (ref 7.5–11.1)
RBC # BLD: 2.84 M/CU MM (ref 4.2–5.4)
SEGMENTED NEUTROPHILS ABSOLUTE COUNT: 11.8 K/CU MM
SEGMENTED NEUTROPHILS RELATIVE PERCENT: 83.4 % (ref 36–66)
TOTAL IMMATURE NEUTOROPHIL: 0.09 K/CU MM
TOTAL NUCLEATED RBC: 0 K/CU MM
WBC # BLD: 14.2 K/CU MM (ref 4–10.5)

## 2023-02-03 PROCEDURE — 6370000000 HC RX 637 (ALT 250 FOR IP): Performed by: OBSTETRICS & GYNECOLOGY

## 2023-02-03 PROCEDURE — 6370000000 HC RX 637 (ALT 250 FOR IP)

## 2023-02-03 PROCEDURE — 36415 COLL VENOUS BLD VENIPUNCTURE: CPT

## 2023-02-03 PROCEDURE — 1220000000 HC SEMI PRIVATE OB R&B

## 2023-02-03 PROCEDURE — 85025 COMPLETE CBC W/AUTO DIFF WBC: CPT

## 2023-02-03 RX ORDER — NITROFURANTOIN 25; 75 MG/1; MG/1
100 CAPSULE ORAL EVERY 12 HOURS SCHEDULED
Status: DISCONTINUED | OUTPATIENT
Start: 2023-02-03 | End: 2023-02-04 | Stop reason: HOSPADM

## 2023-02-03 RX ORDER — FAMOTIDINE 20 MG/1
20 TABLET, FILM COATED ORAL 2 TIMES DAILY
Status: DISCONTINUED | OUTPATIENT
Start: 2023-02-03 | End: 2023-02-04 | Stop reason: HOSPADM

## 2023-02-03 RX ORDER — ACETAMINOPHEN 325 MG/1
650 TABLET ORAL EVERY 6 HOURS PRN
Status: DISCONTINUED | OUTPATIENT
Start: 2023-02-03 | End: 2023-02-04 | Stop reason: HOSPADM

## 2023-02-03 RX ADMIN — DOCUSATE SODIUM 100 MG: 100 CAPSULE, LIQUID FILLED ORAL at 22:07

## 2023-02-03 RX ADMIN — DOCUSATE SODIUM 100 MG: 100 CAPSULE, LIQUID FILLED ORAL at 08:41

## 2023-02-03 RX ADMIN — ACETAMINOPHEN 650 MG: 325 TABLET ORAL at 19:42

## 2023-02-03 RX ADMIN — NITROFURANTOIN MONOHYDRATE/MACROCRYSTALS 100 MG: 75; 25 CAPSULE ORAL at 22:07

## 2023-02-03 RX ADMIN — OXYCODONE HYDROCHLORIDE 5 MG: 5 TABLET ORAL at 19:43

## 2023-02-03 RX ADMIN — FAMOTIDINE 20 MG: 20 TABLET, FILM COATED ORAL at 10:51

## 2023-02-03 RX ADMIN — ACETAMINOPHEN 650 MG: 325 TABLET ORAL at 13:12

## 2023-02-03 RX ADMIN — IBUPROFEN 800 MG: 800 TABLET, FILM COATED ORAL at 08:41

## 2023-02-03 RX ADMIN — OXYCODONE HYDROCHLORIDE 5 MG: 5 TABLET ORAL at 13:12

## 2023-02-03 RX ADMIN — FERROUS SULFATE TAB 325 MG (65 MG ELEMENTAL FE) 325 MG: 325 (65 FE) TAB at 08:41

## 2023-02-03 RX ADMIN — NITROFURANTOIN MONOHYDRATE/MACROCRYSTALS 100 MG: 75; 25 CAPSULE ORAL at 10:51

## 2023-02-03 RX ADMIN — FAMOTIDINE 20 MG: 20 TABLET, FILM COATED ORAL at 22:07

## 2023-02-03 RX ADMIN — ACETAMINOPHEN 650 MG: 325 TABLET ORAL at 02:23

## 2023-02-03 RX ADMIN — IBUPROFEN 800 MG: 800 TABLET, FILM COATED ORAL at 17:09

## 2023-02-03 RX ADMIN — FERROUS SULFATE TAB 325 MG (65 MG ELEMENTAL FE) 325 MG: 325 (65 FE) TAB at 17:09

## 2023-02-03 ASSESSMENT — PAIN DESCRIPTION - LOCATION
LOCATION: ABDOMEN
LOCATION: BACK

## 2023-02-03 ASSESSMENT — PAIN DESCRIPTION - ONSET: ONSET: ON-GOING

## 2023-02-03 ASSESSMENT — PAIN DESCRIPTION - DESCRIPTORS
DESCRIPTORS: CRAMPING
DESCRIPTORS: ACHING

## 2023-02-03 ASSESSMENT — PAIN DESCRIPTION - PAIN TYPE: TYPE: ACUTE PAIN

## 2023-02-03 ASSESSMENT — PAIN - FUNCTIONAL ASSESSMENT
PAIN_FUNCTIONAL_ASSESSMENT: ACTIVITIES ARE NOT PREVENTED
PAIN_FUNCTIONAL_ASSESSMENT: ACTIVITIES ARE NOT PREVENTED

## 2023-02-03 ASSESSMENT — PAIN SCALES - GENERAL
PAINLEVEL_OUTOF10: 3
PAINLEVEL_OUTOF10: 5
PAINLEVEL_OUTOF10: 2
PAINLEVEL_OUTOF10: 3
PAINLEVEL_OUTOF10: 5
PAINLEVEL_OUTOF10: 5

## 2023-02-03 ASSESSMENT — PAIN DESCRIPTION - ORIENTATION
ORIENTATION: MID
ORIENTATION: MID

## 2023-02-03 ASSESSMENT — PAIN DESCRIPTION - FREQUENCY: FREQUENCY: INTERMITTENT

## 2023-02-03 NOTE — PLAN OF CARE
Problem: Postpartum  Goal: Experiences normal postpartum course  Description:  Postpartum OB-Pregnancy care plan goal which identifies if the mother is experiencing a normal postpartum course  2023 2004 by Zeke Capellan RN  Outcome: Progressing  2023 1107 by Earnest Zazueta RN  Outcome: Progressing  Goal: Appropriate maternal -  bonding  Description:  Postpartum OB-Pregnancy care plan goal which identifies if the mother and  are bonding appropriately  2023 by Zeke Capellan RN  Outcome: Progressing  2023 1107 by Earnest Zazueta RN  Outcome: Progressing  Goal: Establishment of infant feeding pattern  Description:  Postpartum OB-Pregnancy care plan goal which identifies if the mother is establishing a feeding pattern with their   2023 by Zeke Capellan RN  Outcome: Progressing  2023 110 by Earnest Zazueta RN  Outcome: Progressing  Goal: Incisions, wounds, or drain sites healing without S/S of infection  2023 by Zeke Capellan RN  Outcome: Progressing  Flowsheets (Taken 2023 1446)  Incisions, Wounds, or Drain Sites Healing Without Sign and Symptoms of Infection: TWICE DAILY: Assess and document skin integrity  2023 110 by Earnest Zazueta RN  Outcome: Progressing     Problem: Pain  Goal: Verbalizes/displays adequate comfort level or baseline comfort level  2023 by Zeke Capellan RN  Outcome: Progressing  Flowsheets (Taken 2023 1446)  Verbalizes/displays adequate comfort level or baseline comfort level:   Encourage patient to monitor pain and request assistance   Assess pain using appropriate pain scale   Administer analgesics based on type and severity of pain and evaluate response   Implement non-pharmacological measures as appropriate and evaluate response   Consider cultural and social influences on pain and pain management  2023 1107 by Earnest Zazueta RN  Outcome: Progressing     Problem: Infection - Adult  Goal: Absence of infection at discharge  2/2/2023 2004 by Stephani Christopher RN  Outcome: Progressing  2/2/2023 1107 by Ramona Grubbs RN  Outcome: Progressing  Goal: Absence of infection during hospitalization  2/2/2023 2004 by Stephani Christopher RN  Outcome: Progressing  2/2/2023 1107 by Ramona Grubbs RN  Outcome: Progressing  Goal: Absence of fever/infection during anticipated neutropenic period  2/2/2023 2004 by Stephani Christopher RN  Outcome: Progressing  2/2/2023 1107 by Ramona Grubbs RN  Outcome: Progressing     Problem: Safety - Adult  Goal: Free from fall injury  2/2/2023 2004 by Stephani Christopher RN  Outcome: Progressing  2/2/2023 1107 by Ramona Grubbs RN  Outcome: Progressing     Problem: Discharge Planning  Goal: Discharge to home or other facility with appropriate resources  2/2/2023 2004 by Stephani Christopher RN  Outcome: Progressing  2/2/2023 1107 by Ramona Grubbs RN  Outcome: Progressing

## 2023-02-03 NOTE — PROGRESS NOTES
Department of Obstetrics and Gynecology  Labor and Delivery   Post Partum Progress Note      SUBJECTIVE:  Doing well with no complaints. Reports bleeding is decreasing and pain is well controlled with medication. Has voided without difficulty. Has not had BM, but + flatus. Eating and drinking well. Denies HA/visual changes/epigastric pain. Breastfeeding is going well. Reports good social support. Denies emotional concerns. OBJECTIVE:      Vitals:  /64   Pulse 98   Temp 98 °F (36.7 °C) (Oral)   Resp 16   LMP 2022   SpO2 100%   Breastfeeding Unknown   Lab Results   Component Value Date    WBC 14.7 (H) 2023    HGB 10.5 (L) 2023    HCT 31.6 (L) 2023    MCV 91.1 2023     2023       ABDOMEN:  Soft, non-tender. Fundus firm at u-1. BS present x 4 quadrants. LOCHIA: Normal per pt  LUNGS: CTAB  HEART: RRR  EXTREMITIES: No calf tenderness, erythema or swelling bilaterally       ASSESSMENT:      PPD # 1  S/p   Breastfeeding well  GBS Negative   RH O+/A+  Anemia     PLAN:     Will Continue PP POC   Will plan for discharge on PPD2.       NALINI Carlton - HEIDY

## 2023-02-04 VITALS
RESPIRATION RATE: 18 BRPM | SYSTOLIC BLOOD PRESSURE: 112 MMHG | TEMPERATURE: 98 F | DIASTOLIC BLOOD PRESSURE: 69 MMHG | HEART RATE: 101 BPM | OXYGEN SATURATION: 98 %

## 2023-02-04 PROCEDURE — 6370000000 HC RX 637 (ALT 250 FOR IP): Performed by: OBSTETRICS & GYNECOLOGY

## 2023-02-04 PROCEDURE — 6370000000 HC RX 637 (ALT 250 FOR IP)

## 2023-02-04 RX ORDER — PSEUDOEPHEDRINE HCL 30 MG
100 TABLET ORAL 2 TIMES DAILY
Qty: 60 CAPSULE | Refills: 0 | Status: SHIPPED | OUTPATIENT
Start: 2023-02-04

## 2023-02-04 RX ORDER — FERROUS SULFATE 325(65) MG
325 TABLET ORAL 2 TIMES DAILY WITH MEALS
Qty: 30 TABLET | Refills: 3 | Status: SHIPPED | OUTPATIENT
Start: 2023-02-04

## 2023-02-04 RX ORDER — IBUPROFEN 800 MG/1
800 TABLET ORAL EVERY 8 HOURS PRN
Qty: 120 TABLET | Refills: 0 | Status: SHIPPED | OUTPATIENT
Start: 2023-02-04

## 2023-02-04 RX ORDER — NITROFURANTOIN 25; 75 MG/1; MG/1
100 CAPSULE ORAL EVERY 12 HOURS SCHEDULED
Qty: 10 CAPSULE | Refills: 0 | Status: SHIPPED | OUTPATIENT
Start: 2023-02-04 | End: 2023-02-09

## 2023-02-04 RX ADMIN — OXYCODONE HYDROCHLORIDE 5 MG: 5 TABLET ORAL at 06:20

## 2023-02-04 RX ADMIN — FERROUS SULFATE TAB 325 MG (65 MG ELEMENTAL FE) 325 MG: 325 (65 FE) TAB at 17:50

## 2023-02-04 RX ADMIN — FERROUS SULFATE TAB 325 MG (65 MG ELEMENTAL FE) 325 MG: 325 (65 FE) TAB at 09:44

## 2023-02-04 RX ADMIN — IBUPROFEN 800 MG: 800 TABLET, FILM COATED ORAL at 09:49

## 2023-02-04 RX ADMIN — OXYCODONE HYDROCHLORIDE 5 MG: 5 TABLET ORAL at 00:28

## 2023-02-04 RX ADMIN — IBUPROFEN 800 MG: 800 TABLET, FILM COATED ORAL at 00:26

## 2023-02-04 RX ADMIN — ACETAMINOPHEN 650 MG: 325 TABLET ORAL at 14:01

## 2023-02-04 RX ADMIN — NITROFURANTOIN MONOHYDRATE/MACROCRYSTALS 100 MG: 75; 25 CAPSULE ORAL at 09:46

## 2023-02-04 RX ADMIN — IBUPROFEN 800 MG: 800 TABLET, FILM COATED ORAL at 17:51

## 2023-02-04 RX ADMIN — DOCUSATE SODIUM 100 MG: 100 CAPSULE, LIQUID FILLED ORAL at 09:44

## 2023-02-04 RX ADMIN — FAMOTIDINE 20 MG: 20 TABLET, FILM COATED ORAL at 09:48

## 2023-02-04 ASSESSMENT — PAIN DESCRIPTION - LOCATION
LOCATION: ABDOMEN
LOCATION: BACK
LOCATION: BACK;ABDOMEN
LOCATION: ABDOMEN
LOCATION: BACK
LOCATION: ABDOMEN
LOCATION: ABDOMEN
LOCATION: BACK

## 2023-02-04 ASSESSMENT — PAIN - FUNCTIONAL ASSESSMENT
PAIN_FUNCTIONAL_ASSESSMENT: ACTIVITIES ARE NOT PREVENTED

## 2023-02-04 ASSESSMENT — PAIN SCALES - GENERAL
PAINLEVEL_OUTOF10: 2
PAINLEVEL_OUTOF10: 2
PAINLEVEL_OUTOF10: 4
PAINLEVEL_OUTOF10: 3
PAINLEVEL_OUTOF10: 4
PAINLEVEL_OUTOF10: 4
PAINLEVEL_OUTOF10: 2
PAINLEVEL_OUTOF10: 4

## 2023-02-04 ASSESSMENT — PAIN DESCRIPTION - ORIENTATION
ORIENTATION: LOWER;MID
ORIENTATION: LOWER
ORIENTATION: LOWER;MID
ORIENTATION: LOWER;MID
ORIENTATION: MID;LOWER
ORIENTATION: LOWER

## 2023-02-04 ASSESSMENT — PAIN DESCRIPTION - DESCRIPTORS
DESCRIPTORS: CRAMPING
DESCRIPTORS: CRAMPING
DESCRIPTORS: ACHING;SORE;CRAMPING
DESCRIPTORS: DISCOMFORT
DESCRIPTORS: CRAMPING
DESCRIPTORS: ACHING
DESCRIPTORS: SORE;ACHING;CRAMPING
DESCRIPTORS: ACHING

## 2023-02-04 ASSESSMENT — PAIN DESCRIPTION - FREQUENCY
FREQUENCY: INTERMITTENT
FREQUENCY: INTERMITTENT

## 2023-02-04 ASSESSMENT — PAIN DESCRIPTION - PAIN TYPE
TYPE: ACUTE PAIN
TYPE: ACUTE PAIN

## 2023-02-04 ASSESSMENT — PAIN DESCRIPTION - ONSET
ONSET: GRADUAL
ONSET: GRADUAL

## 2023-02-04 NOTE — PLAN OF CARE
Problem: Postpartum  Goal: Experiences normal postpartum course  Description:  Postpartum OB-Pregnancy care plan goal which identifies if the mother is experiencing a normal postpartum course  2023 1630 by Ranjana Davis RN  Outcome: Completed  2023 by Stefanie Mark RN  Outcome: Progressing  Goal: Appropriate maternal -  bonding  Description:  Postpartum OB-Pregnancy care plan goal which identifies if the mother and  are bonding appropriately  2023 1630 by Ranjana Davis RN  Outcome: Completed  2023 by Stefanie Mark RN  Outcome: Progressing  Goal: Establishment of infant feeding pattern  Description:  Postpartum OB-Pregnancy care plan goal which identifies if the mother is establishing a feeding pattern with their   2023 163 by Ranjana Davis RN  Outcome: Completed  2023 by Stefanie Mark RN  Outcome: Progressing  Goal: Incisions, wounds, or drain sites healing without S/S of infection  2023 163 by Ranjana Davis RN  Outcome: Completed  2023 [id number] [facility] by Stefanie Mark RN  Outcome: Progressing     Problem: Pain  Goal: Verbalizes/displays adequate comfort level or baseline comfort level  2023 163 by Ranjana Davis RN  Outcome: Completed  Flowsheets (Taken 2023 0610 by Stefanie Mark RN)  Verbalizes/displays adequate comfort level or baseline comfort level:   Assess pain using appropriate pain scale   Administer analgesics based on type and severity of pain and evaluate response   Implement non-pharmacological measures as appropriate and evaluate response  2023 by Stefanie Mark RN  Outcome: Progressing  Flowsheets (Taken 2/3/2023 2200)  Verbalizes/displays adequate comfort level or baseline comfort level:   Assess pain using appropriate pain scale   Administer analgesics based on type and severity of pain and evaluate response   Implement non-pharmacological measures as appropriate and evaluate response     Problem: Infection - Adult  Goal: Absence of infection at discharge  Outcome: Completed  Goal: Absence of infection during hospitalization  2/4/2023 1630 by Ángela Villanueva RN  Outcome: Completed  2/4/2023 0445 by Candace Mena RN  Outcome: Progressing  Goal: Absence of fever/infection during anticipated neutropenic period  2/4/2023 1630 by Ángela Villanueva RN  Outcome: Completed  2/4/2023 0445 by Candace Mena RN  Outcome: Progressing     Problem: Safety - Adult  Goal: Free from fall injury  2/4/2023 1630 by Ángela Villanueva RN  Outcome: Completed  2/4/2023 0445 by Candace Mena RN  Outcome: Progressing     Problem: Discharge Planning  Goal: Discharge to home or other facility with appropriate resources  Outcome: Completed

## 2023-02-04 NOTE — DISCHARGE SUMMARY
Obstetrical Discharge Form    Gestational Age:  38w7d    Antepartum complications: limited prenatal care    Date of Delivery:   2023      Type of Delivery:   vaginal, spontaneous    Delivered By:                 Kirstie Templetonire:       Information for the patient's :  Denita Moncada [facility] [place] [[id number]]      Anesthesia:    Epidural    Intrapartum complications: None    Feeding method:   breast    Postpartum complications: anemia, UTI diagnosed during admission     Discharge Date:   2023    Condition of discharge:  good    Plan:   Follow up    6 weeks for pp visit.      NALINI Rosario CNM

## 2023-02-04 NOTE — PLAN OF CARE
Problem: Postpartum  Goal: Experiences normal postpartum course  Description:  Postpartum OB-Pregnancy care plan goal which identifies if the mother is experiencing a normal postpartum course  2023 by Taylor Lewis RN  Outcome: Progressing  2/3/2023 2127 by Cristian Gonzales RN  Outcome: Progressing  Goal: Appropriate maternal -  bonding  Description:  Postpartum OB-Pregnancy care plan goal which identifies if the mother and  are bonding appropriately  2023 by Taylor Lewis RN  Outcome: Progressing  2/3/2023 2127 by Cristian Gonzales RN  Outcome: Progressing  Goal: Establishment of infant feeding pattern  Description:  Postpartum OB-Pregnancy care plan goal which identifies if the mother is establishing a feeding pattern with their   2023 by Taylor Lewis RN  Outcome: Progressing  2/3/2023 2127 by Cristian Gonzales RN  Outcome: Progressing  Goal: Incisions, wounds, or drain sites healing without S/S of infection  2023 by Taylor Lewis RN  Outcome: Progressing  2/3/2023 2127 by Cristian Gonzales RN  Outcome: Progressing  Flowsheets (Taken 2/3/2023 0841)  Incisions, Wounds, or Drain Sites Healing Without Sign and Symptoms of Infection: TWICE DAILY: Assess and document dressing/incision, wound bed, drain sites and surrounding tissue     Problem: Pain  Goal: Verbalizes/displays adequate comfort level or baseline comfort level  2023 by Taylor Lewis RN  Outcome: Progressing  Flowsheets (Taken 2/3/2023 2200)  Verbalizes/displays adequate comfort level or baseline comfort level:   Assess pain using appropriate pain scale   Administer analgesics based on type and severity of pain and evaluate response   Implement non-pharmacological measures as appropriate and evaluate response  2/3/2023 2127 by Cristian Gonzales RN  Outcome: Progressing  Flowsheets (Taken 2/3/2023 0841)  Verbalizes/displays adequate comfort level or baseline comfort level:   Encourage patient to monitor pain and request assistance   Assess pain using appropriate pain scale   Administer analgesics based on type and severity of pain and evaluate response   Implement non-pharmacological measures as appropriate and evaluate response   Consider cultural and social influences on pain and pain management     Problem: Infection - Adult  Goal: Absence of infection at discharge  2/3/2023 2127 by Ochoa Huitron RN  Outcome: Progressing  Goal: Absence of infection during hospitalization  2/4/2023 0445 by Blanco Pugh RN  Outcome: Progressing  2/3/2023 2127 by Ochoa Huitron RN  Outcome: Progressing  Flowsheets (Taken 2/3/2023 1403)  Absence of infection during hospitalization:   Assess and monitor for signs and symptoms of infection   Monitor lab/diagnostic results   Monitor all insertion sites i.e., indwelling lines, tubes and drains   Administer medications as ordered   Instruct and encourage patient and family to use good hand hygiene technique  Goal: Absence of fever/infection during anticipated neutropenic period  2/4/2023 0445 by Blanco Pugh RN  Outcome: Progressing  2/3/2023 2127 by Ochoa Huitron RN  Outcome: Progressing     Problem: Safety - Adult  Goal: Free from fall injury  2/4/2023 0445 by Blanco Pugh RN  Outcome: Progressing  2/3/2023 2127 by Ochoa Huitron RN  Outcome: Progressing     Problem: Discharge Planning  Goal: Discharge to home or other facility with appropriate resources  2/3/2023 2127 by Ochoa Huitron RN  Outcome: Progressing  Flowsheets (Taken 2/3/2023 1172)  Discharge to home or other facility with appropriate resources: Identify barriers to discharge with patient and caregiver

## 2023-02-04 NOTE — PROGRESS NOTES
Department of Obstetrics and Gynecology  Labor and Delivery   Post Partum Progress Note      SUBJECTIVE:  Doing well with no complaints. Reports bleeding is decreasing and pain is well controlled with medication. Has voided without difficulty. Has not had BM, but + flatus. Eating and drinking well. Denies HA/visual changes/epigastric pain. Breastfeeding is going well. Reports good social support. Denies emotional concerns. She does endorse a hx of anxiety/depression but reports it has been controlled for a long time. OBJECTIVE:      Vitals:  BP (!) 100/53   Pulse 98   Temp 98.1 °F (36.7 °C) (Oral)   Resp 16   LMP 2022   SpO2 97%   Breastfeeding Unknown   Lab Results   Component Value Date    WBC 14.2 (H) 2023    HGB 8.6 (L) 2023    HCT 26.1 (L) 2023    MCV 91.9 2023     2023       ABDOMEN:  Soft, non-tender. Fundus firm at u-1. BS present x 4 quadrants. LOCHIA: Normal per pt  LUNGS: CTAB  HEART: RRR  EXTREMITIES: No calf tenderness, erythema or swelling bilaterally       ASSESSMENT:      PPD # 2  S/p   Breastfeeding well  Limited prenatal care   GBS neg   RH +   Hx of anxiety/depression; not interested in starting medication \  Anemia (hgb 8.6)  Urine cx + for e.coli (25,000k) and UA on admission with s/s of UTI. Started on macrobid per Dr. Vidal Morgan:     Will plan for discharge today. Discharge teaching completed including counseling on warning signs (heavy vaginal bleeding, s/s of preeclampsia, fever >100.4, ACHES, s/s of PPD). Rx for macrobid x 5 days, ferrous sulfate, colace and ibuprofen. Pt to schedule f/u pp visit at 6 weeks in the office.        Teo Loja, NALINI - HEIDY

## 2023-02-04 NOTE — DISCHARGE INSTRUCTIONS
Discharge Instructions    Thank you for letting us care for you and your family. The following are  discharge instructions for yourself and your baby. If you have any questions once you have arrived home please feel free to contact the birthing center at [phone]. MOM INSTRUCTIONS    DIET    Eat a well balanced diet focusing on foods high in fiber and protein. Drink plenty of fluids (  8 to 10 glasses a day) especially water. To avoid constipation you may take a mild stool softener as recommended by your doctor or midwife. ACTIVITY    Gradually increase your activity. Resume your exercise regimen only after advised by you doctor or midwife. Avoid lifting anything heavier than your baby for 6 weeks or until otherwise advised by your doctor or midwife. Avoid driving for 2 weeks or if taking narcotics. Climb stairs one at a time. Use caution when carrying your baby up and down the stairs. NO SEXUAL ACTIVITY and nothing in your vagina( tampons or douching) for 4 to 6 weeks or until advised by your doctor or midwife. Be prepared to discuss family planning at your follow-up OB visit. You may feel tired or have a lack of energy. Nap when the baby naps to catch up on your sleep. You may continue to take prenatal vitamin to replenish nutrients post delivery as directed by your doctor or midwife. EMOTIONS    You may feel sad, teary, overwhelmed and newell. Contact your OB provider if symptoms worsen or last more than 2 weeks. Contact your OB provider if you have thoughts of harming yourself or your baby. If your baby will not stop crying and you are feeling overwhelmed, place your baby in a crib on their back and contact another adult for help. NEVER SHAKE A BABY. Depression After Childbirth: Care Instructions  It's common to lose sleep, feel irritable, and cry easily during the first few days after childbirth. Hormone changes and the demands of a new baby can cause these \"baby blues. \" If these mood changes last more than 2 weeks, you may have postpartum depression. This is a medical condition that requires treatment. If you have any of these signs, you may be depressed. See your doctor right away. You feel very sad or hopeless and lose interest in daily activities. You sleep too much or not enough. You feel tired or as if you have no energy. You eat too much or too little. You write or talk about death. Where to get help 24 hours a day, 7 days a week   If you or someone you know talks about suicide, self-harm, a mental health crisis, a substance use crisis, or any other kind of emotional distress, get help right away. You can:   Call the Suicide and Crisis Lifeline at 65. Call 8-517-251-TALK (). 0-985.258.2768    Text HOME to 982775 to access the Crisis Text Line. Consider saving these numbers in your phone. What you can do   Try to go to all of your counseling sessions. Take medicines as directed. Eat healthy foods. Get daily exercise, such as walks. Try to get some sunlight every day. Avoid using alcohol or other substances. Get as much rest as possible. Connect with friends, and join a support group for new parents. When should you call for help? Call 931 if: You feel you cannot stop from hurting yourself, your baby, or someone else. Call your doctor now or seek immediate medical care if:    You are having trouble caring for yourself or your baby. You hear voices. Contact your doctor if:    You have problems with your medicines. You do not get better as expected. Follow-up care is a key part of your treatment and safety. Be sure to make and go to all appointments, and call your doctor if you are having problems. It's also a good idea to know your test results and keep a list of the medicines you take. Where can you learn more?   Go to http://www.woods.com/ and enter Y765 to learn more about \"Depression After Childbirth: Care Instructions. \"  Current as of: February 9, 2022               Content Version: 13.5  © 2006-2022 [employer]. Care instructions adapted under license by [facility] ([facility]). If you have questions about a medical condition or this instruction, always ask your healthcare professional. [address] any warranty or liability for your use of this information. BLEEDING    Your vaginal bleeding will decrease in amount over the next 2 to 6 weeks. You will notice that as your activity increases your flow may increase. This is your body's way of telling you to take it easy and rest more. If you saturate more than one maxi pad in an hour or you pass a clot larger than a lemon and resting does not help the flow slow down , call your OB doctor or midwife. If your bleeding has a foul odor call you doctor or midwife. BREAST CARE    For breastfeeding moms:  Refer to your breastfeeding booklet provided by the hospital if you have any questions. If you become engorged,feeding may be more difficult or painful for 1 to 2 days. You may find it helpful to express some milk before feeding so that the infant can latch on more easily. Continue to take your prenatal vitamins as directed by your doctor or midwife while you are breastfeeding. For any questions or concerns, contact our Lactation Consultant at [phone]. For non-breastfeeding moms:  You may apply ice packs to your breasts over your bra for twenty minutes at a time for comfort. Avoid stimulation to your breasts. When showering allow the water to strike your back not your breasts. Do not express your milk. Wear a good fitting bra. PERINEAL CARE    Use the madelaine-bottle every time after you use the toilet. Cleanse your perineum from front to back. If you had stitches in your perineum,they will dissolve in 4 to 6 weeks.   You may use a sitz bath and Tucks pads as directed and as needed for comfort. SWELLING    Try to keep your legs elevated when you are sitting. When lying down keep your legs elevated. When wearing stockings or socks,make sure they are not too tight. WHEN TO CALL THE DOCTOR    If you have a temperature of 100.6 degrees or higher. If your bleeding has increased and your are soaking a maxi-pad in an hour. If your abdomen is tender to touch. If you are passing blood clots bigger than the size of a lemon. If you are experiencing extreme weakness or dizziness. If you have are having flu-like symptoms such as achy joints and muscles. If there is a foul smell or a green color to your vaginal bleeding. If you have pain that can not be relieved. If you have persistent burning with urination or frequent urination. If you have concerns about your well-being. If you have a warm,firm, red lump in your breast.  If you are unable to sleep,eat, or are having thoughts of harming yourself or the baby. If you have a red,warm, tender area in your calf. If you have swelling, bleeding, drainage,foul odor,redness or warmth in or around your incision or stitches. PAMPHLETS GIVEN TO PARENTS    [employer]   Good Nutrition for Women, Infants and Children  : Sounds of Pertussis to help protect the health and wellness of adults and infants. [address] [facility]: [address] [facility]: [place] hearing Screening  [facility]: [place], [place] [facility] Postpartum Depression [address] Department of Health: All kids need Hepatitis B shots! Back to sleep handout  Shaken baby handout        [address]   [address]  [phone]      [place] [address] [address] [facility]  [phone]    I verify that I have received the above information,that I have reviewed it and that I have no further questions.  [employer] has provided me with the opportunity to view instructional videos pertaining to care of myself and my baby. I will share this information with all caregivers for my child(evelin). I feel confident to care for myself and my baby. Thank you for the opportunity to care for you and your family!

## 2023-02-04 NOTE — PLAN OF CARE
Problem: Postpartum  Goal: Experiences normal postpartum course  Description:  Postpartum OB-Pregnancy care plan goal which identifies if the mother is experiencing a normal postpartum course  Outcome: Progressing  Goal: Appropriate maternal -  bonding  Description:  Postpartum OB-Pregnancy care plan goal which identifies if the mother and  are bonding appropriately  Outcome: Progressing  Goal: Establishment of infant feeding pattern  Description:  Postpartum OB-Pregnancy care plan goal which identifies if the mother is establishing a feeding pattern with their   Outcome: Progressing  Goal: Incisions, wounds, or drain sites healing without S/S of infection  Outcome: Progressing  Flowsheets (Taken 2/3/2023 0841)  Incisions, Wounds, or Drain Sites Healing Without Sign and Symptoms of Infection: TWICE DAILY: Assess and document dressing/incision, wound bed, drain sites and surrounding tissue     Problem: Pain  Goal: Verbalizes/displays adequate comfort level or baseline comfort level  Outcome: Progressing  Flowsheets (Taken 2/3/2023 0841)  Verbalizes/displays adequate comfort level or baseline comfort level:   Encourage patient to monitor pain and request assistance   Assess pain using appropriate pain scale   Administer analgesics based on type and severity of pain and evaluate response   Implement non-pharmacological measures as appropriate and evaluate response   Consider cultural and social influences on pain and pain management     Problem: Infection - Adult  Goal: Absence of infection at discharge  Outcome: Progressing  Goal: Absence of infection during hospitalization  Outcome: Progressing  Flowsheets (Taken 2/3/2023 0841)  Absence of infection during hospitalization:   Assess and monitor for signs and symptoms of infection   Monitor lab/diagnostic results   Monitor all insertion sites i.e., indwelling lines, tubes and drains   Administer medications as ordered   Instruct and encourage patient and family to use good hand hygiene technique  Goal: Absence of fever/infection during anticipated neutropenic period  Outcome: Progressing     Problem: Safety - Adult  Goal: Free from fall injury  Outcome: Progressing     Problem: Discharge Planning  Goal: Discharge to home or other facility with appropriate resources  Outcome: Progressing  Flowsheets (Taken 2/3/2023 5207)  Discharge to home or other facility with appropriate resources: Identify barriers to discharge with patient and caregiver

## 2023-02-05 LAB
CULTURE: ABNORMAL
CULTURE: ABNORMAL
Lab: ABNORMAL
SPECIMEN: ABNORMAL

## 2023-02-05 NOTE — FLOWSHEET NOTE
ID bands checked. Infant's ID band removed and stapled to footprint sheet, the mother verified as correct, signed and witnessed by RN. Hugs tag removed. Mother of baby signed Safe Baby Crib Form verifying that she does have a safe crib for baby at home. Discharge instructions given and reviewed. Patient voiced understanding. Rxs reviewed and Electronically sent to Out Patient Pharmacy. Patient voiced understanding. Patient is ambulating well at discharge with pain #0. Pt's  is driving patient and baby home. Mother verbalizes understanding to follow-up with Pediatric Provider and OB Provider as instructed. Baby pink, harnessed into carseat at discharge by parents. Parents and baby escorted to hospital exit by nurse.

## 2024-04-25 ENCOUNTER — APPOINTMENT (OUTPATIENT)
Dept: GENERAL RADIOLOGY | Age: 24
End: 2024-04-25
Payer: COMMERCIAL

## 2024-04-25 ENCOUNTER — APPOINTMENT (OUTPATIENT)
Dept: CT IMAGING | Age: 24
End: 2024-04-25
Payer: COMMERCIAL

## 2024-04-25 ENCOUNTER — HOSPITAL ENCOUNTER (EMERGENCY)
Age: 24
Discharge: HOME | End: 2024-04-25
Payer: COMMERCIAL

## 2024-04-25 VITALS
TEMPERATURE: 98.5 F | HEART RATE: 67 BPM | BODY MASS INDEX: 39.15 KG/M2 | WEIGHT: 235 LBS | DIASTOLIC BLOOD PRESSURE: 58 MMHG | OXYGEN SATURATION: 98 % | SYSTOLIC BLOOD PRESSURE: 116 MMHG | HEIGHT: 65 IN | RESPIRATION RATE: 18 BRPM

## 2024-04-25 DIAGNOSIS — K42.9 PERIUMBILICAL HERNIA: Primary | ICD-10-CM

## 2024-04-25 LAB
ALBUMIN SERPL-MCNC: 4.8 GM/DL (ref 3.4–5)
ALP BLD-CCNC: 85 IU/L (ref 40–129)
ALT SERPL-CCNC: 11 U/L (ref 10–40)
ANION GAP SERPL CALCULATED.3IONS-SCNC: 12 MMOL/L (ref 7–16)
AST SERPL-CCNC: 17 IU/L (ref 15–37)
BACTERIA: NEGATIVE /HPF
BASOPHILS ABSOLUTE: 0 K/CU MM
BASOPHILS RELATIVE PERCENT: 0.4 % (ref 0–1)
BILIRUB SERPL-MCNC: 0.5 MG/DL (ref 0–1)
BILIRUBIN URINE: NEGATIVE MG/DL
BLOOD, URINE: NEGATIVE
BUN SERPL-MCNC: 10 MG/DL (ref 6–23)
CALCIUM SERPL-MCNC: 9.5 MG/DL (ref 8.3–10.6)
CHLORIDE BLD-SCNC: 103 MMOL/L (ref 99–110)
CLARITY: ABNORMAL
CO2: 24 MMOL/L (ref 21–32)
COLOR: YELLOW
CREAT SERPL-MCNC: 0.6 MG/DL (ref 0.6–1.1)
DIFFERENTIAL TYPE: ABNORMAL
EOSINOPHILS ABSOLUTE: 0 K/CU MM
EOSINOPHILS RELATIVE PERCENT: 0.3 % (ref 0–3)
GFR SERPL CREATININE-BSD FRML MDRD: >90 ML/MIN/1.73M2
GLUCOSE SERPL-MCNC: 83 MG/DL (ref 70–99)
GLUCOSE, URINE: NEGATIVE MG/DL
HCT VFR BLD CALC: 43.4 % (ref 37–47)
HEMOGLOBIN: 14.2 GM/DL (ref 12.5–16)
IMMATURE NEUTROPHIL %: 0.1 % (ref 0–0.43)
INTERPRETATION: NORMAL
KETONES, URINE: ABNORMAL MG/DL
LEUKOCYTE ESTERASE, URINE: ABNORMAL
LIPASE: 25 IU/L (ref 13–60)
LYMPHOCYTES ABSOLUTE: 2.3 K/CU MM
LYMPHOCYTES RELATIVE PERCENT: 28.9 % (ref 24–44)
MCH RBC QN AUTO: 31.1 PG (ref 27–31)
MCHC RBC AUTO-ENTMCNC: 32.7 % (ref 32–36)
MCV RBC AUTO: 95 FL (ref 78–100)
MONOCYTES ABSOLUTE: 0.5 K/CU MM
MONOCYTES RELATIVE PERCENT: 6.2 % (ref 0–4)
MUCUS: ABNORMAL HPF
NEUTROPHILS RELATIVE PERCENT: 64.1 % (ref 36–66)
NITRITE URINE, QUANTITATIVE: NEGATIVE
NON SQUAM EPI CELLS: <1 /HPF
NUCLEATED RBC %: 0 %
PDW BLD-RTO: 12.4 % (ref 11.7–14.9)
PH, URINE: 5 (ref 5–8)
PLATELET # BLD: 248 K/CU MM (ref 140–440)
PMV BLD AUTO: 11.2 FL (ref 7.5–11.1)
POTASSIUM SERPL-SCNC: 3.8 MMOL/L (ref 3.5–5.1)
PREGNANCY, URINE: NEGATIVE
PROTEIN UA: NEGATIVE MG/DL
RBC # BLD: 4.57 M/CU MM (ref 4.2–5.4)
RBC URINE: 3 /HPF (ref 0–6)
SEGMENTED NEUTROPHILS ABSOLUTE COUNT: 5.1 K/CU MM
SODIUM BLD-SCNC: 139 MMOL/L (ref 135–145)
SPECIFIC GRAVITY UA: >1.03 (ref 1–1.03)
SQUAMOUS EPITHELIAL: 43 /HPF
TOTAL IMMATURE NEUTOROPHIL: 0.01 K/CU MM
TOTAL NUCLEATED RBC: 0 K/CU MM
TOTAL PROTEIN: 8.1 GM/DL (ref 6.4–8.2)
TRICHOMONAS: ABNORMAL /HPF
UNCLASSIFIED CAST: 6 /LPF
UROBILINOGEN, URINE: 0.2 MG/DL (ref 0.2–1)
WBC # BLD: 7.9 K/CU MM (ref 4–10.5)
WBC UA: 2 /HPF (ref 0–5)

## 2024-04-25 PROCEDURE — 83690 ASSAY OF LIPASE: CPT

## 2024-04-25 PROCEDURE — 6360000002 HC RX W HCPCS: Performed by: PHYSICIAN ASSISTANT

## 2024-04-25 PROCEDURE — 87086 URINE CULTURE/COLONY COUNT: CPT

## 2024-04-25 PROCEDURE — 96374 THER/PROPH/DIAG INJ IV PUSH: CPT

## 2024-04-25 PROCEDURE — 74177 CT ABD & PELVIS W/CONTRAST: CPT

## 2024-04-25 PROCEDURE — 85025 COMPLETE CBC W/AUTO DIFF WBC: CPT

## 2024-04-25 PROCEDURE — 99285 EMERGENCY DEPT VISIT HI MDM: CPT

## 2024-04-25 PROCEDURE — 6360000004 HC RX CONTRAST MEDICATION: Performed by: PHYSICIAN ASSISTANT

## 2024-04-25 PROCEDURE — 81025 URINE PREGNANCY TEST: CPT

## 2024-04-25 PROCEDURE — 71045 X-RAY EXAM CHEST 1 VIEW: CPT

## 2024-04-25 PROCEDURE — 81001 URINALYSIS AUTO W/SCOPE: CPT

## 2024-04-25 PROCEDURE — 80053 COMPREHEN METABOLIC PANEL: CPT

## 2024-04-25 RX ORDER — SODIUM CHLORIDE 0.9 % (FLUSH) 0.9 %
5-40 SYRINGE (ML) INJECTION 2 TIMES DAILY
Status: DISCONTINUED | OUTPATIENT
Start: 2024-04-25 | End: 2024-04-26 | Stop reason: HOSPADM

## 2024-04-25 RX ORDER — KETOROLAC TROMETHAMINE 15 MG/ML
30 INJECTION, SOLUTION INTRAMUSCULAR; INTRAVENOUS ONCE
Status: COMPLETED | OUTPATIENT
Start: 2024-04-25 | End: 2024-04-25

## 2024-04-25 RX ADMIN — IOPAMIDOL 75 ML: 755 INJECTION, SOLUTION INTRAVENOUS at 20:55

## 2024-04-25 RX ADMIN — KETOROLAC TROMETHAMINE 30 MG: 15 INJECTION, SOLUTION INTRAMUSCULAR; INTRAVENOUS at 20:03

## 2024-04-25 ASSESSMENT — PAIN - FUNCTIONAL ASSESSMENT: PAIN_FUNCTIONAL_ASSESSMENT: 0-10

## 2024-04-25 ASSESSMENT — PAIN DESCRIPTION - ORIENTATION: ORIENTATION: LEFT;UPPER

## 2024-04-25 ASSESSMENT — PAIN SCALES - GENERAL
PAINLEVEL_OUTOF10: 8
PAINLEVEL_OUTOF10: 7

## 2024-04-25 ASSESSMENT — PAIN DESCRIPTION - LOCATION: LOCATION: ABDOMEN

## 2024-04-25 NOTE — ED PROVIDER NOTES
[facility] EMERGENCY DEPARTMENT  EMERGENCY DEPARTMENT ENCOUNTER        Pt Name: Geovanna Guajardo  MRN: [id number]  Birthdate 2000  Date of evaluation: 4/25/2024  Provider: Frankie Han PA-C  PCP: No primary care provider on file.      KIM. I have evaluated this patient.        CHIEF COMPLAINT      Chief Complaint   Patient presents with    Abdominal Pain     LUQ pain, nausea but denies vomiting        HISTORY OF PRESENT ILLNESS:     History from : Patient and spouse    Limitations to history : None    Geovanna Guajardo is a 23 y.o. female non smoker who presents with overt left-sided abdominal pain.  She first noticed this today.  She feels it is accompanied with some swelling described  as acute in onset, intermittent, stabbing, no  migration, waxes and wanes.  At one point was radiating to RLQ but no longer. Better with laying down.  No change after tea or water.  No other treatments at home.  Per patient she has some chronic shortness of breath that spouse mentions is every day. LMP: 3 weeks ago. Accompanied with her 15month old son but patient is not nursing.   Denies: cough, uri symptoms, hemoptysis, leg swelling, h/o of DVT, dyspareunia, dysuria, melena, PSHx, nausea, vomiting, rash.      Nursing Notes were all reviewed and agreed with or any disagreements were addressed in the HPI.    REVIEW OF SYSTEMS :     Review of Systems   All other systems reviewed and are negative.      Pertinent positives and negatives are stated within HPI    PAST HISTORY   has a past medical history of ADHD (attention deficit hyperactivity disorder), Anxiety, Asthma, Depression, and Scoliosis.    History reviewed. No pertinent surgical history.    History reviewed. No pertinent family history.    Social History     Tobacco Use    Smoking status: Former     Current packs/day: 0.50     Types: Cigarettes    Smokeless tobacco: Never   Vaping Use    Vaping Use: Some days

## 2024-04-26 LAB
CULTURE: NORMAL
Lab: NORMAL
SPECIMEN: NORMAL

## 2024-04-26 NOTE — DISCHARGE INSTRUCTIONS
Return to ED with worsening, fever, committing, shortness, or any new concerns.    Follow up with  a primary care provider and/or the general surgeon for re-evaluation of your symptoms and any further management.

## 2025-02-27 NOTE — LETTER
[facility] Emergency Department  [address] [address]  Phone: [phone]  Fax: [phone]             May 16, 2022    Patient: Jose Manuel Cormier   YOB: 2000   Date of Visit: 5/16/2022       To Whom It May Concern:    Rae Daley was seen and treated in our emergency department on 5/16/2022. She may return to work on 05/18/2022 with the restrictions of light duty and not lifting  Items bigger than 5lbs-10lbs with her right arm until seen by her orthopedic surgeon.        Sincerely,             Signature:__________________________________ none

## 2025-08-13 RX ORDER — MV-MN 110/FA/OM3/DHA/EPA/FISH 180-35-25
1 TABLET,CHEWABLE ORAL DAILY
COMMUNITY

## 2025-08-13 ASSESSMENT — LIFESTYLE VARIABLES
HOW OFTEN DO YOU HAVE A DRINK CONTAINING ALCOHOL: MONTHLY OR LESS
HOW MANY STANDARD DRINKS CONTAINING ALCOHOL DO YOU HAVE ON A TYPICAL DAY: 1 OR 2